# Patient Record
Sex: FEMALE | Race: BLACK OR AFRICAN AMERICAN | NOT HISPANIC OR LATINO | Employment: UNEMPLOYED | URBAN - METROPOLITAN AREA
[De-identification: names, ages, dates, MRNs, and addresses within clinical notes are randomized per-mention and may not be internally consistent; named-entity substitution may affect disease eponyms.]

---

## 2019-12-31 ENCOUNTER — HOSPITAL ENCOUNTER (EMERGENCY)
Facility: HOSPITAL | Age: 34
Discharge: HOME | End: 2019-12-31
Attending: EMERGENCY MEDICINE
Payer: MEDICAID

## 2019-12-31 VITALS
HEART RATE: 80 BPM | WEIGHT: 124 LBS | OXYGEN SATURATION: 98 % | SYSTOLIC BLOOD PRESSURE: 98 MMHG | DIASTOLIC BLOOD PRESSURE: 55 MMHG | TEMPERATURE: 98 F | BODY MASS INDEX: 21.97 KG/M2 | HEIGHT: 63 IN | RESPIRATION RATE: 15 BRPM

## 2019-12-31 DIAGNOSIS — R07.9 CHEST PAIN: ICD-10-CM

## 2019-12-31 DIAGNOSIS — R51.9 NONINTRACTABLE HEADACHE, UNSPECIFIED CHRONICITY PATTERN, UNSPECIFIED HEADACHE TYPE: ICD-10-CM

## 2019-12-31 DIAGNOSIS — B34.9 VIRAL SYNDROME: Primary | ICD-10-CM

## 2019-12-31 LAB
ALBUMIN SERPL BCP-MCNC: 4.7 G/DL (ref 3.5–5.2)
ALP SERPL-CCNC: 62 U/L (ref 55–135)
ALT SERPL W/O P-5'-P-CCNC: 6 U/L (ref 10–44)
ANION GAP SERPL CALC-SCNC: 8 MMOL/L (ref 8–16)
AST SERPL-CCNC: 16 U/L (ref 10–40)
B-HCG UR QL: NEGATIVE
BACTERIA #/AREA URNS HPF: NORMAL /HPF
BASOPHILS # BLD AUTO: 0.01 K/UL (ref 0–0.2)
BASOPHILS NFR BLD: 0.1 % (ref 0–1.9)
BILIRUB SERPL-MCNC: 1 MG/DL (ref 0.1–1)
BILIRUB UR QL STRIP: NEGATIVE
BUN SERPL-MCNC: 8 MG/DL (ref 6–20)
CALCIUM SERPL-MCNC: 9.8 MG/DL (ref 8.7–10.5)
CHLORIDE SERPL-SCNC: 102 MMOL/L (ref 95–110)
CLARITY UR: CLEAR
CO2 SERPL-SCNC: 21 MMOL/L (ref 23–29)
COLOR UR: YELLOW
CREAT SERPL-MCNC: 0.9 MG/DL (ref 0.5–1.4)
CTP QC/QA: YES
CTP QC/QA: YES
DIFFERENTIAL METHOD: ABNORMAL
EOSINOPHIL # BLD AUTO: 0 K/UL (ref 0–0.5)
EOSINOPHIL NFR BLD: 0 % (ref 0–8)
ERYTHROCYTE [DISTWIDTH] IN BLOOD BY AUTOMATED COUNT: 14.9 % (ref 11.5–14.5)
EST. GFR  (AFRICAN AMERICAN): >60 ML/MIN/1.73 M^2
EST. GFR  (NON AFRICAN AMERICAN): >60 ML/MIN/1.73 M^2
GLUCOSE SERPL-MCNC: 82 MG/DL (ref 70–110)
GLUCOSE UR QL STRIP: NEGATIVE
HCT VFR BLD AUTO: 42.3 % (ref 37–48.5)
HGB BLD-MCNC: 13.6 G/DL (ref 12–16)
HGB UR QL STRIP: NEGATIVE
HYALINE CASTS #/AREA URNS LPF: 0 /LPF
IMM GRANULOCYTES # BLD AUTO: 0.03 K/UL (ref 0–0.04)
IMM GRANULOCYTES NFR BLD AUTO: 0.3 % (ref 0–0.5)
KETONES UR QL STRIP: NEGATIVE
LACTATE SERPL-SCNC: 1.4 MMOL/L (ref 0.5–2.2)
LEUKOCYTE ESTERASE UR QL STRIP: NEGATIVE
LYMPHOCYTES # BLD AUTO: 1.2 K/UL (ref 1–4.8)
LYMPHOCYTES NFR BLD: 13.1 % (ref 18–48)
MCH RBC QN AUTO: 26.5 PG (ref 27–31)
MCHC RBC AUTO-ENTMCNC: 32.2 G/DL (ref 32–36)
MCV RBC AUTO: 82 FL (ref 82–98)
MICROSCOPIC COMMENT: NORMAL
MONOCYTES # BLD AUTO: 0.6 K/UL (ref 0.3–1)
MONOCYTES NFR BLD: 6.8 % (ref 4–15)
NEUTROPHILS # BLD AUTO: 7.2 K/UL (ref 1.8–7.7)
NEUTROPHILS NFR BLD: 79.7 % (ref 38–73)
NITRITE UR QL STRIP: NEGATIVE
NRBC BLD-RTO: 0 /100 WBC
PH UR STRIP: 5 [PH] (ref 5–8)
PLATELET # BLD AUTO: 293 K/UL (ref 150–350)
PMV BLD AUTO: 10.2 FL (ref 9.2–12.9)
POC MOLECULAR INFLUENZA A AGN: NEGATIVE
POC MOLECULAR INFLUENZA B AGN: NEGATIVE
POTASSIUM SERPL-SCNC: 3.8 MMOL/L (ref 3.5–5.1)
PROT SERPL-MCNC: 9.3 G/DL (ref 6–8.4)
PROT UR QL STRIP: ABNORMAL
RBC # BLD AUTO: 5.14 M/UL (ref 4–5.4)
RBC #/AREA URNS HPF: 0 /HPF (ref 0–4)
SODIUM SERPL-SCNC: 131 MMOL/L (ref 136–145)
SP GR UR STRIP: 1.02 (ref 1–1.03)
SQUAMOUS #/AREA URNS HPF: 4 /HPF
URN SPEC COLLECT METH UR: ABNORMAL
UROBILINOGEN UR STRIP-ACNC: NEGATIVE EU/DL
WBC # BLD AUTO: 9.01 K/UL (ref 3.9–12.7)
WBC #/AREA URNS HPF: 3 /HPF (ref 0–5)

## 2019-12-31 PROCEDURE — 25000003 PHARM REV CODE 250: Performed by: PHYSICIAN ASSISTANT

## 2019-12-31 PROCEDURE — 63600175 PHARM REV CODE 636 W HCPCS: Performed by: PHYSICIAN ASSISTANT

## 2019-12-31 PROCEDURE — 83605 ASSAY OF LACTIC ACID: CPT

## 2019-12-31 PROCEDURE — 96375 TX/PRO/DX INJ NEW DRUG ADDON: CPT

## 2019-12-31 PROCEDURE — 85025 COMPLETE CBC W/AUTO DIFF WBC: CPT

## 2019-12-31 PROCEDURE — 87040 BLOOD CULTURE FOR BACTERIA: CPT | Mod: 59

## 2019-12-31 PROCEDURE — 96361 HYDRATE IV INFUSION ADD-ON: CPT

## 2019-12-31 PROCEDURE — 80053 COMPREHEN METABOLIC PANEL: CPT

## 2019-12-31 PROCEDURE — 81000 URINALYSIS NONAUTO W/SCOPE: CPT

## 2019-12-31 PROCEDURE — 81025 URINE PREGNANCY TEST: CPT | Performed by: PHYSICIAN ASSISTANT

## 2019-12-31 PROCEDURE — 99285 EMERGENCY DEPT VISIT HI MDM: CPT | Mod: 25

## 2019-12-31 PROCEDURE — 96374 THER/PROPH/DIAG INJ IV PUSH: CPT

## 2019-12-31 PROCEDURE — 93010 ELECTROCARDIOGRAM REPORT: CPT | Mod: ,,, | Performed by: INTERNAL MEDICINE

## 2019-12-31 PROCEDURE — 93005 ELECTROCARDIOGRAM TRACING: CPT

## 2019-12-31 PROCEDURE — 93010 EKG 12-LEAD: ICD-10-PCS | Mod: ,,, | Performed by: INTERNAL MEDICINE

## 2019-12-31 PROCEDURE — 87502 INFLUENZA DNA AMP PROBE: CPT

## 2019-12-31 RX ORDER — KETOROLAC TROMETHAMINE 30 MG/ML
15 INJECTION, SOLUTION INTRAMUSCULAR; INTRAVENOUS
Status: COMPLETED | OUTPATIENT
Start: 2019-12-31 | End: 2019-12-31

## 2019-12-31 RX ORDER — PROCHLORPERAZINE EDISYLATE 5 MG/ML
10 INJECTION INTRAMUSCULAR; INTRAVENOUS
Status: COMPLETED | OUTPATIENT
Start: 2019-12-31 | End: 2019-12-31

## 2019-12-31 RX ORDER — ACETAMINOPHEN 325 MG/1
650 TABLET ORAL
Status: COMPLETED | OUTPATIENT
Start: 2019-12-31 | End: 2019-12-31

## 2019-12-31 RX ORDER — DIPHENHYDRAMINE HYDROCHLORIDE 50 MG/ML
25 INJECTION INTRAMUSCULAR; INTRAVENOUS
Status: COMPLETED | OUTPATIENT
Start: 2019-12-31 | End: 2019-12-31

## 2019-12-31 RX ADMIN — PROCHLORPERAZINE EDISYLATE 10 MG: 5 INJECTION INTRAMUSCULAR; INTRAVENOUS at 01:12

## 2019-12-31 RX ADMIN — DIPHENHYDRAMINE HYDROCHLORIDE 25 MG: 50 INJECTION INTRAMUSCULAR; INTRAVENOUS at 01:12

## 2019-12-31 RX ADMIN — KETOROLAC TROMETHAMINE 15 MG: 30 INJECTION, SOLUTION INTRAMUSCULAR at 11:12

## 2019-12-31 RX ADMIN — ACETAMINOPHEN 650 MG: 325 TABLET ORAL at 11:12

## 2019-12-31 RX ADMIN — SODIUM CHLORIDE 1686 ML: 0.9 INJECTION, SOLUTION INTRAVENOUS at 11:12

## 2019-12-31 NOTE — DISCHARGE INSTRUCTIONS
Continue ibuprofen or Aleve for headache, but do not take both.  You may take Tylenol with either 1 of these medications for headache. Drink plenty of water and fluids with electrolytes to stay hydrated.

## 2019-12-31 NOTE — ED PROVIDER NOTES
Encounter Date: 12/31/2019    SCRIBE #1 NOTE: I, Samantha Ingram, am scribing for, and in the presence of,  Juan Ramon Haynes PA-C . I have scribed the entire note. Other sections scribed: HPI ROS PE .       History     Chief Complaint   Patient presents with    Headache     patient complain of HA, fever, constipation, N/V, chest pain, generalized body aches that started yesterday. patient states that she was recently on ABT for UTI.    Fever    constipation    Nausea    Vomiting    Chest Pain     This is a 34 y.o. female with a PMHx of HIV (reports compliance with medications, last CD4 count was over 800, 2 months ago) and sickle cell trait  who presents to the ED complaining of headache that began few days. The patient reports associated symptoms nausea, 1 episode of emesis, constipation, chest pain, dry cough, body aches and appetite change that began last night. She denies dysuria or abdominal pain. She notes taking Ibuprofen last night with no relief. The patient reports that 1 week ago she had an UTI. 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        Review of patient's allergies indicates:  No Known Allergies  Past Medical History:   Diagnosis Date    HIV (human immunodeficiency virus infection)     Sickle cell trait      Past Surgical History:   Procedure Laterality Date    FRACTURE SURGERY      SINUS SURGERY       Family History   Problem Relation Age of Onset    Breast cancer Neg Hx     Colon cancer Neg Hx     Ovarian cancer Neg Hx      Social History     Tobacco Use    Smoking status: Never Smoker   Substance Use Topics    Alcohol use: No    Drug use: No     Review of Systems   Constitutional: Positive for appetite change and fatigue. Negative for fever.   HENT: Negative for sore throat.    Eyes: Negative for photophobia and visual disturbance.   Respiratory: Positive for cough (dry).    Cardiovascular: Positive for chest pain. Negative for leg swelling.   Gastrointestinal: Positive for  constipation, nausea and vomiting (1 episode ). Negative for abdominal pain.   Genitourinary: Negative for dysuria.   Musculoskeletal: Negative for back pain, neck pain and neck stiffness.   Skin: Negative for pallor and rash.   Neurological: Positive for headaches. Negative for dizziness and syncope.       Physical Exam     Initial Vitals [12/31/19 1046]   BP Pulse Resp Temp SpO2   (!) 95/55 (!) 111 18 (!) 101 °F (38.3 °C) 99 %      MAP       --         Physical Exam    Nursing note and vitals reviewed.  Constitutional: She appears well-developed and well-nourished.   HENT:   Head: Normocephalic and atraumatic.   Right Ear: Tympanic membrane and external ear normal.   Left Ear: Tympanic membrane and external ear normal.   Nose: Nose normal.   Mouth/Throat: Oropharynx is clear and moist and mucous membranes are normal. No trismus in the jaw. No dental abscesses or uvula swelling. No oropharyngeal exudate.   Eyes: Conjunctivae are normal. Pupils are equal, round, and reactive to light.   Neck: Normal range of motion. Neck supple. No Brudzinski's sign and no Kernig's sign noted.   Cardiovascular: Normal rate, regular rhythm, normal heart sounds and intact distal pulses. Exam reveals no friction rub.    No murmur heard.  Pulmonary/Chest: Breath sounds normal. No respiratory distress. She has no wheezes. She has no rhonchi. She has no rales.   Abdominal: Soft. Bowel sounds are normal. She exhibits no distension and no mass. There is no tenderness. There is no rebound and no guarding.   Musculoskeletal: Normal range of motion. She exhibits no edema.   Neurological: She is alert and oriented to person, place, and time. She has normal reflexes.   Skin: Skin is warm and dry. Capillary refill takes less than 2 seconds. No rash noted.         ED Course   Procedures  Labs Reviewed   URINALYSIS, REFLEX TO URINE CULTURE - Abnormal; Notable for the following components:       Result Value    Protein, UA 1+ (*)     All other  components within normal limits    Narrative:     Preferred Collection Type->Urine, Clean Catch   CBC W/ AUTO DIFFERENTIAL - Abnormal; Notable for the following components:    Mean Corpuscular Hemoglobin 26.5 (*)     RDW 14.9 (*)     Gran% 79.7 (*)     Lymph% 13.1 (*)     All other components within normal limits   COMPREHENSIVE METABOLIC PANEL - Abnormal; Notable for the following components:    Sodium 131 (*)     CO2 21 (*)     Total Protein 9.3 (*)     ALT 6 (*)     All other components within normal limits   CULTURE, BLOOD   CULTURE, BLOOD   LACTIC ACID, PLASMA    Narrative:     Recoll. [id number] by [facility] at 12/31/2019 12:02, reason: Specimen   hemolyzed.  Tube has been refrigerated   12/31/2019  12:02   URINALYSIS MICROSCOPIC    Narrative:     Preferred Collection Type->Urine, Clean Catch   POCT URINE PREGNANCY   POCT INFLUENZA A/B MOLECULAR     EKG Readings: (Independently Interpreted)   Initial Reading: No STEMI. Previous EKG: Compared with most recent EKG Previous EKG Date: January 12, 2013. Rhythm: Normal Sinus Rhythm. Ectopy: No Ectopy. Axis: Normal.   .  Good R-wave progression.     ECG Results          EKG 12-lead (In process)  Result time 12/31/19 12:17:31    In process by Interface, Lab In [facility] (12/31/19 12:17:31)                 Narrative:    Test Reason : R07.9,    Vent. Rate : 093 BPM     Atrial Rate : 093 BPM     P-R Int : 142 ms          QRS Dur : 068 ms      QT Int : 354 ms       P-R-T Axes : 069 061 050 degrees     QTc Int : 440 ms    Normal sinus rhythm  Normal ECG  When compared with ECG of 12-JAN-2013 09:38,  Significant changes have occurred    Referred By: [email or web address]   SELF           Confirmed By:                   In process by Interface, Lab In [facility] (12/31/19 12:14:36)                 Narrative:    Test Reason : R07.9,    Vent. Rate : 093 BPM     Atrial Rate : 093 BPM     P-R Int : 142 ms          QRS Dur : 068 ms      QT Int : 354 ms       P-R-T Axes : 069 061 050 degrees      QTc Int : 440 ms    Normal sinus rhythm  Normal ECG  When compared with ECG of 12-JAN-2013 09:38,  Significant changes have occurred    Referred By: [email or web address]   SELF           Confirmed By:                             Imaging Results          X-Ray Chest AP Portable (Final result)  Result time 12/31/19 11:42:55    Final result by Kavon Walsh MD (12/31/19 11:42:55)                 Impression:      No acute chest disease identified.      Electronically signed by: Kavon Walsh MD  Date:    12/31/2019  Time:    11:42             Narrative:    EXAMINATION:  XR CHEST AP PORTABLE    CLINICAL HISTORY:  Sepsis;    TECHNIQUE:  Single frontal view of the chest was performed.    COMPARISON:  01/13/2013.    FINDINGS:  The heart is not enlarged.  Superior mediastinal structures are unremarkable.  Pulmonary vasculature is within normal limits.  The lungs are free of focal consolidations.  There is no evidence for pneumothorax or large pleural effusions.  Bony structures are grossly intact.                              X-Rays:   Independently Interpreted Readings:   Chest X-Ray: Normal heart size.  No infiltrates.  No acute abnormalities.     Medical Decision Making:   ED Management:  34-year-old female with HIV, compliant with medications with last CD4 count over 800, presents with headache and constellation of symptoms likely suggestive of viral syndrome.  Influenza negative. Chest x-ray without evidence of pneumonia, pneumothorax, dissection.  EKG without evidence of acute ischemia or pericarditis.  UPT negative.  UA without evidence for infection.  Lab evaluation is reassuring, including normal lactate and white blood cell count.  Very low suspicion for sepsis or intracranial infection..  Patient has no meningeal signs or evidence of intracranial hemorrhage. No indication for head CT at this time.  Patient treated with IV fluids, Toradol, Tylenol, and Compazine with improved symptoms. She is well-appearing, tolerating  p.o., with improved heart rate.  No indications for antibiotics at this time.  Patient is safe for discharge with continued p.o. hydration, ibuprofen Tylenol as needed for fever.            Scribe Attestation:   Scribe #1: I performed the above scribed service and the documentation accurately describes the services I performed. I attest to the accuracy of the note.                     I, Juan Ramon Haynes, personally performed the services described in this documentation. All medical record entries made by the scribe were at my direction and in my presence.  I have reviewed the chart and agree that the record reflects my personal performance and is accurate and complete        Clinical Impression:       ICD-10-CM ICD-9-CM   1. Viral syndrome B34.9 079.99   2. Chest pain R07.9 786.50   3. Nonintractable headache, unspecified chronicity pattern, unspecified headache type R51 784.0                             Juan Ramon Haynes, PAKalyanC  12/31/19 1506

## 2020-01-02 ENCOUNTER — HOSPITAL ENCOUNTER (EMERGENCY)
Facility: HOSPITAL | Age: 35
Discharge: HOME | End: 2020-01-03
Attending: EMERGENCY MEDICINE
Payer: MEDICAID

## 2020-01-02 VITALS
WEIGHT: 125 LBS | SYSTOLIC BLOOD PRESSURE: 131 MMHG | OXYGEN SATURATION: 100 % | DIASTOLIC BLOOD PRESSURE: 80 MMHG | RESPIRATION RATE: 18 BRPM | HEIGHT: 63 IN | BODY MASS INDEX: 22.15 KG/M2 | HEART RATE: 88 BPM | TEMPERATURE: 100 F

## 2020-01-02 DIAGNOSIS — R11.2 NON-INTRACTABLE VOMITING WITH NAUSEA, UNSPECIFIED VOMITING TYPE: ICD-10-CM

## 2020-01-02 DIAGNOSIS — B34.9 VIRAL SYNDROME: Primary | ICD-10-CM

## 2020-01-02 DIAGNOSIS — R51.9 ACUTE NONINTRACTABLE HEADACHE, UNSPECIFIED HEADACHE TYPE: ICD-10-CM

## 2020-01-02 LAB
ALBUMIN SERPL BCP-MCNC: 4 G/DL (ref 3.5–5.2)
ALP SERPL-CCNC: 54 U/L (ref 55–135)
ALT SERPL W/O P-5'-P-CCNC: 8 U/L (ref 10–44)
ANION GAP SERPL CALC-SCNC: 9 MMOL/L (ref 8–16)
AST SERPL-CCNC: 15 U/L (ref 10–40)
BASOPHILS # BLD AUTO: 0.01 K/UL (ref 0–0.2)
BASOPHILS NFR BLD: 0.2 % (ref 0–1.9)
BILIRUB SERPL-MCNC: 0.2 MG/DL (ref 0.1–1)
BUN SERPL-MCNC: 7 MG/DL (ref 6–20)
CALCIUM SERPL-MCNC: 9.4 MG/DL (ref 8.7–10.5)
CHLORIDE SERPL-SCNC: 109 MMOL/L (ref 95–110)
CO2 SERPL-SCNC: 24 MMOL/L (ref 23–29)
CREAT SERPL-MCNC: 0.8 MG/DL (ref 0.5–1.4)
CTP QC/QA: YES
DIFFERENTIAL METHOD: ABNORMAL
EOSINOPHIL # BLD AUTO: 0.1 K/UL (ref 0–0.5)
EOSINOPHIL NFR BLD: 1.8 % (ref 0–8)
ERYTHROCYTE [DISTWIDTH] IN BLOOD BY AUTOMATED COUNT: 14.3 % (ref 11.5–14.5)
EST. GFR  (AFRICAN AMERICAN): >60 ML/MIN/1.73 M^2
EST. GFR  (NON AFRICAN AMERICAN): >60 ML/MIN/1.73 M^2
GLUCOSE SERPL-MCNC: 100 MG/DL (ref 70–110)
HCT VFR BLD AUTO: 39.9 % (ref 37–48.5)
HGB BLD-MCNC: 12.6 G/DL (ref 12–16)
IMM GRANULOCYTES # BLD AUTO: 0.01 K/UL (ref 0–0.04)
IMM GRANULOCYTES NFR BLD AUTO: 0.2 % (ref 0–0.5)
LYMPHOCYTES # BLD AUTO: 1.2 K/UL (ref 1–4.8)
LYMPHOCYTES NFR BLD: 26.3 % (ref 18–48)
MCH RBC QN AUTO: 26 PG (ref 27–31)
MCHC RBC AUTO-ENTMCNC: 31.6 G/DL (ref 32–36)
MCV RBC AUTO: 82 FL (ref 82–98)
MONOCYTES # BLD AUTO: 0.5 K/UL (ref 0.3–1)
MONOCYTES NFR BLD: 11 % (ref 4–15)
NEUTROPHILS # BLD AUTO: 2.6 K/UL (ref 1.8–7.7)
NEUTROPHILS NFR BLD: 60.5 % (ref 38–73)
NRBC BLD-RTO: 0 /100 WBC
PLATELET # BLD AUTO: 257 K/UL (ref 150–350)
PMV BLD AUTO: 9.5 FL (ref 9.2–12.9)
POC MOLECULAR INFLUENZA A AGN: NEGATIVE
POC MOLECULAR INFLUENZA B AGN: NEGATIVE
POTASSIUM SERPL-SCNC: 4.3 MMOL/L (ref 3.5–5.1)
PROT SERPL-MCNC: 8.2 G/DL (ref 6–8.4)
RBC # BLD AUTO: 4.84 M/UL (ref 4–5.4)
SODIUM SERPL-SCNC: 142 MMOL/L (ref 136–145)
WBC # BLD AUTO: 4.37 K/UL (ref 3.9–12.7)

## 2020-01-02 PROCEDURE — 80053 COMPREHEN METABOLIC PANEL: CPT

## 2020-01-02 PROCEDURE — 99284 EMERGENCY DEPT VISIT MOD MDM: CPT | Mod: 25

## 2020-01-02 PROCEDURE — 96374 THER/PROPH/DIAG INJ IV PUSH: CPT

## 2020-01-02 PROCEDURE — 25000003 PHARM REV CODE 250: Performed by: PHYSICIAN ASSISTANT

## 2020-01-02 PROCEDURE — 87502 INFLUENZA DNA AMP PROBE: CPT

## 2020-01-02 PROCEDURE — 96375 TX/PRO/DX INJ NEW DRUG ADDON: CPT

## 2020-01-02 PROCEDURE — 85025 COMPLETE CBC W/AUTO DIFF WBC: CPT

## 2020-01-02 PROCEDURE — 63600175 PHARM REV CODE 636 W HCPCS: Performed by: PHYSICIAN ASSISTANT

## 2020-01-02 RX ORDER — KETOROLAC TROMETHAMINE 30 MG/ML
15 INJECTION, SOLUTION INTRAMUSCULAR; INTRAVENOUS
Status: COMPLETED | OUTPATIENT
Start: 2020-01-02 | End: 2020-01-02

## 2020-01-02 RX ORDER — BUTALBITAL, ACETAMINOPHEN AND CAFFEINE 50; 325; 40 MG/1; MG/1; MG/1
1 TABLET ORAL EVERY 6 HOURS PRN
Qty: 6 TABLET | Refills: 0 | Status: ON HOLD | OUTPATIENT
Start: 2020-01-02 | End: 2022-02-03 | Stop reason: HOSPADM

## 2020-01-02 RX ORDER — ONDANSETRON 4 MG/1
4 TABLET, ORALLY DISINTEGRATING ORAL
Status: COMPLETED | OUTPATIENT
Start: 2020-01-02 | End: 2020-01-02

## 2020-01-02 RX ORDER — ONDANSETRON 4 MG/1
4 TABLET, ORALLY DISINTEGRATING ORAL EVERY 12 HOURS PRN
Qty: 10 TABLET | Refills: 0 | Status: SHIPPED | OUTPATIENT
Start: 2020-01-02 | End: 2022-04-01 | Stop reason: ALTCHOICE

## 2020-01-02 RX ORDER — ONDANSETRON 2 MG/ML
4 INJECTION INTRAMUSCULAR; INTRAVENOUS
Status: COMPLETED | OUTPATIENT
Start: 2020-01-02 | End: 2020-01-02

## 2020-01-02 RX ORDER — FERROUS GLUCONATE 324(38)MG
324 TABLET ORAL
COMMUNITY

## 2020-01-02 RX ORDER — PROCHLORPERAZINE EDISYLATE 5 MG/ML
10 INJECTION INTRAMUSCULAR; INTRAVENOUS
Status: COMPLETED | OUTPATIENT
Start: 2020-01-02 | End: 2020-01-02

## 2020-01-02 RX ORDER — DIPHENHYDRAMINE HYDROCHLORIDE 50 MG/ML
12.5 INJECTION INTRAMUSCULAR; INTRAVENOUS
Status: COMPLETED | OUTPATIENT
Start: 2020-01-02 | End: 2020-01-02

## 2020-01-02 RX ADMIN — ONDANSETRON 4 MG: 4 TABLET, ORALLY DISINTEGRATING ORAL at 06:01

## 2020-01-02 RX ADMIN — KETOROLAC TROMETHAMINE 15 MG: 30 INJECTION, SOLUTION INTRAMUSCULAR at 06:01

## 2020-01-02 RX ADMIN — ONDANSETRON HYDROCHLORIDE 4 MG: 2 SOLUTION INTRAMUSCULAR; INTRAVENOUS at 06:01

## 2020-01-02 RX ADMIN — SODIUM CHLORIDE 1000 ML: 0.9 INJECTION, SOLUTION INTRAVENOUS at 06:01

## 2020-01-02 RX ADMIN — DIPHENHYDRAMINE HYDROCHLORIDE 12.5 MG: 50 INJECTION INTRAMUSCULAR; INTRAVENOUS at 06:01

## 2020-01-02 RX ADMIN — PROCHLORPERAZINE EDISYLATE 10 MG: 5 INJECTION INTRAMUSCULAR; INTRAVENOUS at 06:01

## 2020-01-02 NOTE — ED TRIAGE NOTES
Pt. Reports she was seen on 12/31/19 for the same symptoms, pt. Reports headaches, vomiting,and fevers. Pt. Reports every time she eats she vomits, emesis x 10 within the last 24 hours. Pt. Reports she has been taking OTC for her symptoms as reccommended.

## 2020-01-03 NOTE — ED PROVIDER NOTES
Encounter Date: 1/2/2020    SCRIBE #1 NOTE: I, Elizabeth Moore, am scribing for, and in the presence of,  Juan Antonio Mcwilliams PA-C. I have scribed the following portions of the note - Other sections scribed: HPI, ROS.       History     Chief Complaint   Patient presents with    Emesis     Vomiting, HA and cough. seen here yesterday      Chief Complaint: Emesis  History of Present Illness: History provided by patient. This 34 y.o. female presents to the Emergency Department complaining of worsening emesis (10 episodes today) onset 2 days ago. She also complains of 10/10 upper abdominal pain, nausea, and headache. She states that she reported to this ED 2 days ago for similar symptoms. She states that the physician advised her to take Ibuprofen, Tylenol, and DayQuil, which she reports alleviated her fever, chest pain, and generalized body aches. She reports recent sick contact with a coworker. She reports a past medical history of HIV and states that she is compliant with her medications.        Review of patient's allergies indicates:  No Known Allergies  Past Medical History:   Diagnosis Date    HIV (human immunodeficiency virus infection)     Sickle cell trait      Past Surgical History:   Procedure Laterality Date    FRACTURE SURGERY      SINUS SURGERY       Family History   Problem Relation Age of Onset    Breast cancer Neg Hx     Colon cancer Neg Hx     Ovarian cancer Neg Hx      Social History     Tobacco Use    Smoking status: Never Smoker    Smokeless tobacco: Never Used   Substance Use Topics    Alcohol use: No    Drug use: No     Review of Systems   Constitutional: Negative for chills and fever.   HENT: Negative for congestion, rhinorrhea and sore throat.    Eyes: Negative for visual disturbance.   Respiratory: Negative for cough and shortness of breath.    Cardiovascular: Negative for chest pain.   Gastrointestinal: Positive for abdominal pain (Upper), nausea and vomiting. Negative for diarrhea.    Genitourinary: Negative for dysuria, frequency and hematuria.   Musculoskeletal: Negative for back pain and myalgias.   Skin: Negative for rash.   Neurological: Positive for headaches. Negative for dizziness and weakness.       Physical Exam     Initial Vitals [01/02/20 1751]   BP Pulse Resp Temp SpO2   131/80 88 18 99.5 °F (37.5 °C) 100 %      MAP       --         Physical Exam    Nursing note and vitals reviewed.  Constitutional: She appears well-developed and well-nourished. No distress.   HENT:   Head: Normocephalic and atraumatic.   Right Ear: Tympanic membrane normal.   Left Ear: Tympanic membrane normal.   Nose: Nose normal.   Mouth/Throat: Uvula is midline, oropharynx is clear and moist and mucous membranes are normal.   Eyes: EOM are normal. Pupils are equal, round, and reactive to light.   Neck: Trachea normal, normal range of motion, full passive range of motion without pain and phonation normal. Neck supple. No stridor present. No spinous process tenderness and no muscular tenderness present. Normal range of motion present. No neck rigidity. No Brudzinski's sign and no Kernig's sign noted.   Cardiovascular: Normal rate, regular rhythm and normal heart sounds. Exam reveals no gallop and no friction rub.    No murmur heard.  Pulmonary/Chest: Effort normal and breath sounds normal. No respiratory distress. She has no wheezes. She has no rhonchi. She has no rales.   Abdominal: Soft. Bowel sounds are normal. There is no tenderness. There is no rigidity, no rebound, no guarding, no CVA tenderness, no tenderness at McBurney's point and negative Brewster's sign.   Musculoskeletal: Normal range of motion.   Neurological: She is alert and oriented to person, place, and time. She has normal strength. No cranial nerve deficit or sensory deficit. She displays a negative Romberg sign. Coordination and gait normal. GCS eye subscore is 4. GCS verbal subscore is 5. GCS motor subscore is 6.   Skin: Skin is warm and dry.  Capillary refill takes less than 2 seconds.   Psychiatric: She has a normal mood and affect.         ED Course   Procedures  Labs Reviewed   CBC W/ AUTO DIFFERENTIAL - Abnormal; Notable for the following components:       Result Value    Mean Corpuscular Hemoglobin 26.0 (*)     Mean Corpuscular Hemoglobin Conc 31.6 (*)     All other components within normal limits   COMPREHENSIVE METABOLIC PANEL - Abnormal; Notable for the following components:    Alkaline Phosphatase 54 (*)     ALT 8 (*)     All other components within normal limits   POCT INFLUENZA A/B MOLECULAR          Imaging Results    None          Medical Decision Making:   Clinical Tests:   Lab Tests: Ordered and Reviewed  ED Management:  This is an evaluation of a 34 y.o. female that presents to the Emergency Department for headache, nausea, vomiting. The patient is a non-toxic, afebrile, and well appearing female. On physical exam: she is AAO, has no focal weakness or neurological deficits. Has full ROM of neck with no nuchal rigidity or meningeal signs.  There is no staggering or ataxic gait, vomiting, double vision, visual loss, slurred speech, numbness of the face or body, weakness, clumsiness, or incoordination. No external signs of head injury or trauma. No tenderness or induration over the temples.  Abdomen is soft and nontender to palpation without rebound tenderness or guarding. She reports NO: history of malignancy, syncope, current nor recent pregnancy, or seizures associated with this headache. she is not immunocompromised.     Vital Signs are Reassuring.  Previous records were reviewed.  Patient was evaluated in this ED or on 01/31/2019 for similar complaint had extensive workup without significant findings.  Etiology of patient's symptoms were likely secondary to viral illness.  RESULTS:  CBC and CMP repeated today.  CBC shows no leukocytosis.  H&H stable. CMP shows no significant electrolyte abnormalities.  BUN and creatinine are stable.  Influenza negative.    Given the above findings, my overall impression is headache secondary to viral syndrome. Differential Diagnosis included but was not limited to: SAH, epidural hematoma, subdural hematoma, CVA, temporal arteritis, migraine headache, meningitis acute angle glaucoma    ED Treatments:  Patient treated the ED with IV fluids, Toradol, Compazine, Benadryl, Zofran with resolution of headache and symptoms. Upon re-evaluation, patient was resting comfortably in reclining chair and in no acute distress.. DC Meds:  I will discharge patient home with Fioricet and Zofran. The diagnosis, treatment plan, instructions for follow-up and reevaluation with PCP as well as ED return precautions have been discussed with the patient and the patient has verbalized an understanding of the information. All questions or concerns have been addressed.                   Scribe Attestation:   Scribe #1: I performed the above scribed service and the documentation accurately describes the services I performed. I attest to the accuracy of the note.                          Clinical Impression:       ICD-10-CM ICD-9-CM   1. Viral syndrome B34.9 079.99   2. Acute nonintractable headache, unspecified headache type R51 784.0   3. Non-intractable vomiting with nausea, unspecified vomiting type R11.2 787.01                             Juan Antonio Mcwilliams PA-C  01/02/20 2100

## 2020-01-05 LAB
BACTERIA BLD CULT: NORMAL
BACTERIA BLD CULT: NORMAL

## 2021-07-30 ENCOUNTER — OFFICE VISIT (OUTPATIENT)
Dept: URGENT CARE | Facility: CLINIC | Age: 36
End: 2021-07-30

## 2021-07-30 VITALS
TEMPERATURE: 98 F | WEIGHT: 125 LBS | DIASTOLIC BLOOD PRESSURE: 74 MMHG | RESPIRATION RATE: 18 BRPM | HEIGHT: 63 IN | BODY MASS INDEX: 22.15 KG/M2 | OXYGEN SATURATION: 99 % | HEART RATE: 101 BPM | SYSTOLIC BLOOD PRESSURE: 98 MMHG

## 2021-07-30 DIAGNOSIS — Z20.822 CLOSE EXPOSURE TO COVID-19 VIRUS: Primary | ICD-10-CM

## 2021-07-30 LAB
CTP QC/QA: YES
SARS-COV-2 RDRP RESP QL NAA+PROBE: NEGATIVE

## 2021-07-30 PROCEDURE — 99203 OFFICE O/P NEW LOW 30 MIN: CPT | Mod: TIER,S$GLB,, | Performed by: NURSE PRACTITIONER

## 2021-07-30 PROCEDURE — 99203 PR OFFICE/OUTPT VISIT, NEW, LEVL III, 30-44 MIN: ICD-10-PCS | Mod: TIER,S$GLB,, | Performed by: NURSE PRACTITIONER

## 2021-07-30 PROCEDURE — U0002: ICD-10-PCS | Mod: QW,S$GLB,, | Performed by: NURSE PRACTITIONER

## 2021-07-30 PROCEDURE — U0002 COVID-19 LAB TEST NON-CDC: HCPCS | Mod: QW,S$GLB,, | Performed by: NURSE PRACTITIONER

## 2022-01-20 ENCOUNTER — HOSPITAL ENCOUNTER (EMERGENCY)
Facility: HOSPITAL | Age: 37
Discharge: HOME | End: 2022-01-21
Attending: EMERGENCY MEDICINE
Payer: MEDICAID

## 2022-01-20 DIAGNOSIS — O20.9 VAGINAL BLEEDING IN PREGNANCY, FIRST TRIMESTER: Primary | ICD-10-CM

## 2022-01-20 LAB
ABO + RH BLD: NORMAL
ALBUMIN SERPL BCP-MCNC: 4.8 G/DL (ref 3.5–5.2)
ALP SERPL-CCNC: 48 U/L (ref 55–135)
ALT SERPL W/O P-5'-P-CCNC: 12 U/L (ref 10–44)
ANION GAP SERPL CALC-SCNC: 11 MMOL/L (ref 8–16)
AST SERPL-CCNC: 16 U/L (ref 10–40)
B-HCG UR QL: POSITIVE
BASOPHILS # BLD AUTO: 0.02 K/UL (ref 0–0.2)
BASOPHILS NFR BLD: 0.2 % (ref 0–1.9)
BILIRUB SERPL-MCNC: 0.4 MG/DL (ref 0.1–1)
BILIRUB UR QL STRIP: NEGATIVE
BUN SERPL-MCNC: 9 MG/DL (ref 6–20)
CALCIUM SERPL-MCNC: 9.3 MG/DL (ref 8.7–10.5)
CHLORIDE SERPL-SCNC: 101 MMOL/L (ref 95–110)
CLARITY UR: CLEAR
CO2 SERPL-SCNC: 22 MMOL/L (ref 23–29)
COLOR UR: YELLOW
CREAT SERPL-MCNC: 0.6 MG/DL (ref 0.5–1.4)
CTP QC/QA: YES
DIFFERENTIAL METHOD: ABNORMAL
EOSINOPHIL # BLD AUTO: 0.2 K/UL (ref 0–0.5)
EOSINOPHIL NFR BLD: 2.6 % (ref 0–8)
ERYTHROCYTE [DISTWIDTH] IN BLOOD BY AUTOMATED COUNT: 13.3 % (ref 11.5–14.5)
EST. GFR  (AFRICAN AMERICAN): >60 ML/MIN/1.73 M^2
EST. GFR  (NON AFRICAN AMERICAN): >60 ML/MIN/1.73 M^2
GLUCOSE SERPL-MCNC: 95 MG/DL (ref 70–110)
GLUCOSE UR QL STRIP: NEGATIVE
HCG INTACT+B SERPL-ACNC: NORMAL MIU/ML
HCT VFR BLD AUTO: 41.5 % (ref 37–48.5)
HGB BLD-MCNC: 14 G/DL (ref 12–16)
HGB UR QL STRIP: NEGATIVE
IMM GRANULOCYTES # BLD AUTO: 0.05 K/UL (ref 0–0.04)
IMM GRANULOCYTES NFR BLD AUTO: 0.6 % (ref 0–0.5)
KETONES UR QL STRIP: NEGATIVE
LEUKOCYTE ESTERASE UR QL STRIP: NEGATIVE
LYMPHOCYTES # BLD AUTO: 2.4 K/UL (ref 1–4.8)
LYMPHOCYTES NFR BLD: 27.7 % (ref 18–48)
MCH RBC QN AUTO: 28.1 PG (ref 27–31)
MCHC RBC AUTO-ENTMCNC: 33.7 G/DL (ref 32–36)
MCV RBC AUTO: 83 FL (ref 82–98)
MONOCYTES # BLD AUTO: 0.8 K/UL (ref 0.3–1)
MONOCYTES NFR BLD: 8.7 % (ref 4–15)
NEUTROPHILS # BLD AUTO: 5.2 K/UL (ref 1.8–7.7)
NEUTROPHILS NFR BLD: 60.2 % (ref 38–73)
NITRITE UR QL STRIP: NEGATIVE
NRBC BLD-RTO: 0 /100 WBC
PH UR STRIP: 6 [PH] (ref 5–8)
PLATELET # BLD AUTO: 333 K/UL (ref 150–450)
PMV BLD AUTO: 9.5 FL (ref 9.2–12.9)
POTASSIUM SERPL-SCNC: 3.5 MMOL/L (ref 3.5–5.1)
PROT SERPL-MCNC: 8.8 G/DL (ref 6–8.4)
PROT UR QL STRIP: ABNORMAL
RBC # BLD AUTO: 4.99 M/UL (ref 4–5.4)
SODIUM SERPL-SCNC: 134 MMOL/L (ref 136–145)
SP GR UR STRIP: 1.02 (ref 1–1.03)
URN SPEC COLLECT METH UR: ABNORMAL
UROBILINOGEN UR STRIP-ACNC: NEGATIVE EU/DL
WBC # BLD AUTO: 8.59 K/UL (ref 3.9–12.7)

## 2022-01-20 PROCEDURE — 80053 COMPREHEN METABOLIC PANEL: CPT | Performed by: PHYSICIAN ASSISTANT

## 2022-01-20 PROCEDURE — 81025 URINE PREGNANCY TEST: CPT | Performed by: PHYSICIAN ASSISTANT

## 2022-01-20 PROCEDURE — 81003 URINALYSIS AUTO W/O SCOPE: CPT | Performed by: PHYSICIAN ASSISTANT

## 2022-01-20 PROCEDURE — 99284 EMERGENCY DEPT VISIT MOD MDM: CPT

## 2022-01-20 PROCEDURE — 87491 CHLMYD TRACH DNA AMP PROBE: CPT | Performed by: STUDENT IN AN ORGANIZED HEALTH CARE EDUCATION/TRAINING PROGRAM

## 2022-01-20 PROCEDURE — 87591 N.GONORRHOEAE DNA AMP PROB: CPT | Performed by: STUDENT IN AN ORGANIZED HEALTH CARE EDUCATION/TRAINING PROGRAM

## 2022-01-20 PROCEDURE — 84702 CHORIONIC GONADOTROPIN TEST: CPT | Performed by: PHYSICIAN ASSISTANT

## 2022-01-20 PROCEDURE — 86901 BLOOD TYPING SEROLOGIC RH(D): CPT | Performed by: PHYSICIAN ASSISTANT

## 2022-01-20 PROCEDURE — 85025 COMPLETE CBC W/AUTO DIFF WBC: CPT | Performed by: PHYSICIAN ASSISTANT

## 2022-01-20 RX ORDER — LANOLIN ALCOHOL/MO/W.PET/CERES
50 CREAM (GRAM) TOPICAL
Status: COMPLETED | OUTPATIENT
Start: 2022-01-20 | End: 2022-01-21

## 2022-01-20 RX ORDER — ACETAMINOPHEN 500 MG
1000 TABLET ORAL
Status: COMPLETED | OUTPATIENT
Start: 2022-01-20 | End: 2022-01-21

## 2022-01-20 RX ORDER — SODIUM CHLORIDE 9 MG/ML
INJECTION, SOLUTION INTRAVENOUS
Status: DISCONTINUED | OUTPATIENT
Start: 2022-01-20 | End: 2022-01-21 | Stop reason: HOSPADM

## 2022-01-21 VITALS
RESPIRATION RATE: 16 BRPM | HEART RATE: 82 BPM | HEIGHT: 63 IN | WEIGHT: 125 LBS | TEMPERATURE: 99 F | BODY MASS INDEX: 22.15 KG/M2 | OXYGEN SATURATION: 100 % | DIASTOLIC BLOOD PRESSURE: 76 MMHG | SYSTOLIC BLOOD PRESSURE: 108 MMHG

## 2022-01-21 LAB
BILIRUB UR QL STRIP: NEGATIVE
CLARITY UR: CLEAR
COLOR UR: YELLOW
GLUCOSE UR QL STRIP: NEGATIVE
HGB UR QL STRIP: NEGATIVE
KETONES UR QL STRIP: NEGATIVE
LEUKOCYTE ESTERASE UR QL STRIP: NEGATIVE
NITRITE UR QL STRIP: NEGATIVE
PH UR STRIP: 6 [PH] (ref 5–8)
PROT UR QL STRIP: ABNORMAL
SARS-COV-2 RDRP RESP QL NAA+PROBE: NEGATIVE
SP GR UR STRIP: 1.02 (ref 1–1.03)
URN SPEC COLLECT METH UR: ABNORMAL
UROBILINOGEN UR STRIP-ACNC: NEGATIVE EU/DL

## 2022-01-21 PROCEDURE — 25000003 PHARM REV CODE 250: Performed by: STUDENT IN AN ORGANIZED HEALTH CARE EDUCATION/TRAINING PROGRAM

## 2022-01-21 PROCEDURE — 81003 URINALYSIS AUTO W/O SCOPE: CPT | Performed by: STUDENT IN AN ORGANIZED HEALTH CARE EDUCATION/TRAINING PROGRAM

## 2022-01-21 PROCEDURE — U0002 COVID-19 LAB TEST NON-CDC: HCPCS | Performed by: STUDENT IN AN ORGANIZED HEALTH CARE EDUCATION/TRAINING PROGRAM

## 2022-01-21 RX ORDER — BICTEGRAVIR SODIUM, EMTRICITABINE, AND TENOFOVIR ALAFENAMIDE FUMARATE 50; 200; 25 MG/1; MG/1; MG/1
1 TABLET ORAL DAILY
COMMUNITY

## 2022-01-21 RX ORDER — DOXYLAMINE SUCCINATE AND PYRIDOXINE HYDROCHLORIDE, DELAYED RELEASE TABLETS 10 MG/10 MG 10; 10 MG/1; MG/1
2 TABLET, DELAYED RELEASE ORAL NIGHTLY
Qty: 20 TABLET | Refills: 0 | Status: ON HOLD | OUTPATIENT
Start: 2022-01-21 | End: 2022-02-03 | Stop reason: HOSPADM

## 2022-01-21 RX ORDER — ERGOCALCIFEROL 1.25 MG/1
50000 CAPSULE ORAL
COMMUNITY

## 2022-01-21 RX ADMIN — PYRIDOXINE HCL TAB 50 MG 50 MG: 50 TAB at 12:01

## 2022-01-21 RX ADMIN — ACETAMINOPHEN 1000 MG: 500 TABLET, FILM COATED ORAL at 12:01

## 2022-01-21 NOTE — FIRST PROVIDER EVALUATION
Emergency Department TeleTriage Encounter Note      CHIEF COMPLAINT    Chief Complaint   Patient presents with    Vaginal Bleeding     Positive at home pregnancy test end of December. Started vaginal bleeding with cramping Jan 2.- still spotting       VITAL SIGNS   Initial Vitals [01/20/22 1844]   BP Pulse Resp Temp SpO2   (!) 140/87 88 18 98.6 °F (37 °C) 100 %      MAP       --            ALLERGIES    Review of patient's allergies indicates:  No Known Allergies    PROVIDER TRIAGE NOTE    Patient is a 36-year-old female who presents with intermittent vaginal bleeding.  She reports having a positive home pregnancy test at the end of December.  She denies urinary symptoms.    Initial orders will be placed and care will be transferred to an alternate provider when patient is roomed for a full evaluation. Any additional orders and the final disposition will be determined by that provider.      ORDERS  Labs Reviewed   CBC W/ AUTO DIFFERENTIAL   COMPREHENSIVE METABOLIC PANEL   HCG, QUANTITATIVE   URINALYSIS, REFLEX TO URINE CULTURE   POCT URINE PREGNANCY   GROUP & RH       ED Orders (720h ago, onward)    Start Ordered     Status Ordering Provider    01/20/22 1903 01/20/22 1859  US OB Transvaginal  1 time imaging         Ordered [facility]    01/20/22 1859 01/20/22 1859  CBC W/ AUTO DIFFERENTIAL  STAT         Ordered [facility]    01/20/22 1859 01/20/22 1859  Comp. Metabolic Panel  STAT         Ordered [facility]    01/20/22 1859 01/20/22 1859  hCG, quantitative  STAT         Ordered [facility] [place]    01/20/22 1859 01/20/22 1859  ABO/Rh  STAT         Ordered [facility]GR[facility]    01/20/22 1859 01/20/22 1859  Urinalysis, Reflex to Urine Culture Urine, Clean Catch  STAT         Ordered [facility]GROTTO [facility]    01/20/22 1859 01/20/22 1859  POCT urine pregnancy  Once         Ordered [facility]    01/20/22 1859 01/20/22 1859  Setup  Pelvic Tray  Once         Ordered [facility]            Virtual Visit Note: The provider triage portion of this emergency department evaluation and documentation was performed via [employer], a HIPAA-compliant telemedicine application, in concert with a tele-presenter in the room. A face to face patient evaluation with one of my colleagues will occur once the patient is placed in an emergency department room.      DISCLAIMER: This note was prepared with [employer] voice recognition transcription software. Garbled syntax, mangled pronouns, and other bizarre constructions may be attributed to that software system.

## 2022-01-21 NOTE — DISCHARGE INSTRUCTIONS
Please call and schedule an appointment with Dr. [facility] on Monday to get repeat BCG level and US

## 2022-01-21 NOTE — ED PROVIDER NOTES
Encounter Date: 2022       History     Chief Complaint   Patient presents with    Vaginal Bleeding     Positive at home pregnancy test end of December. Started vaginal bleeding with cramping .- still spotting     HPI  35 yo F  presents with chief complaint of pelvic pain and vaginal bleeding.  Patient reports 1 day of heavy vaginal bleeding approximately two weeks ago which self-resolved.  Since that time she has had light vaginal spotting, brown vaginal discharge.  LMP 2021.  Patient complains of mild lower abdominal cramping and back pain.  Denies any abdominal trauma.  No dysuria, hematuria, pain with urination.  Denies any vaginal itching, burning, or lesions.  Patient has not seen a provider for this pregnancy. No current OB. No recent fevers, chills, diarrhea, urinary symptoms.  Patient reports increased weakness and nausea over the last several days but no emesis. No known medical problems. ROS otherwise negative.     Review of patient's allergies indicates:  No Known Allergies  Past Medical History:   Diagnosis Date    HIV (human immunodeficiency virus infection)     Sickle cell trait      Past Surgical History:   Procedure Laterality Date    FRACTURE SURGERY      SINUS SURGERY       Family History   Problem Relation Age of Onset    Breast cancer Neg Hx     Colon cancer Neg Hx     Ovarian cancer Neg Hx      Social History     Tobacco Use    Smoking status: Never Smoker    Smokeless tobacco: Never Used   Substance Use Topics    Alcohol use: No    Drug use: No     Review of Systems   Constitutional: Negative for appetite change, chills and fever.   HENT: Negative for congestion and trouble swallowing.    Eyes: Negative for visual disturbance.   Respiratory: Negative for cough and shortness of breath.    Gastrointestinal: Positive for nausea. Negative for diarrhea and vomiting.   Genitourinary: Positive for pelvic pain and vaginal bleeding. Negative for decreased urine  volume, dysuria, frequency, vaginal discharge and vaginal pain.   Musculoskeletal: Positive for back pain.   Skin: Negative for pallor.   Neurological: Negative for dizziness, syncope, weakness, light-headedness and headaches.       Physical Exam     Initial Vitals [01/20/22 1844]   BP Pulse Resp Temp SpO2   (!) 140/87 88 18 98.6 °F (37 °C) 100 %      MAP       --         Physical Exam    Constitutional: She appears well-developed and well-nourished. No distress.   HENT:   Head: Normocephalic and atraumatic.   Moist mucous membranes   Eyes: Conjunctivae and EOM are normal.   Neck:   Normal range of motion.  Cardiovascular: Normal rate.   Pulmonary/Chest: Breath sounds normal. No respiratory distress.   Abdominal: Abdomen is soft. There is no abdominal tenderness. There is no rebound and no guarding.   Genitourinary:    Uterus normal.   Uterus is not tender. Cervix exhibits motion tenderness. Cervix exhibits no discharge. Right adnexum displays no mass and no tenderness. Left adnexum displays no mass and no tenderness.    Vaginal discharge present.      No vaginal erythema or bleeding.   No erythema or bleeding in the vagina.    No foreign body in the vagina.      Genitourinary Comments: Cervix close, tender on palpation, no friability     Musculoskeletal:      Cervical back: Normal range of motion.           ED Course   Procedures  Labs Reviewed   CBC W/ AUTO DIFFERENTIAL - Abnormal; Notable for the following components:       Result Value    Immature Granulocytes 0.6 (*)     Immature Grans (Abs) 0.05 (*)     All other components within normal limits    Narrative:     Release to patient->Immediate   COMPREHENSIVE METABOLIC PANEL - Abnormal; Notable for the following components:    Sodium 134 (*)     CO2 22 (*)     Total Protein 8.8 (*)     Alkaline Phosphatase 48 (*)     All other components within normal limits    Narrative:     Release to patient->Immediate   URINALYSIS, REFLEX TO URINE CULTURE - Abnormal; Notable  for the following components:    Protein, UA Trace (*)     All other components within normal limits    Narrative:     Specimen Source->Urine   URINALYSIS, REFLEX TO URINE CULTURE - Abnormal; Notable for the following components:    Protein, UA Trace (*)     All other components within normal limits    Narrative:     Specimen Source->Urine   POCT URINE PREGNANCY - Abnormal; Notable for the following components:    POC Preg Test, Ur Positive (*)     All other components within normal limits   C. TRACHOMATIS/N. GONORRHOEAE BY AMP DNA   HCG, QUANTITATIVE    Narrative:     Release to patient->Immediate   SARS-COV-2 RNA AMPLIFICATION, QUAL   GROUP & RH          Imaging Results          US OB Transvaginal (Final result)  Result time 01/20/22 22:25:14   Procedure changed from US OB <14 Wks TransAbd & TransVag, Single Gestation (XPD)     Final result by Koffi Milligan MD (01/20/22 22:25:14)                 Narrative:    EXAM DESCRIPTION: US OB TRANSVAGINAL 1/20/2022 10:21 PM CST    CLINICAL HISTORY: 36 years, Female, Vag Bleeding    COMPARISON: None..    TECHNIQUE:    Utilizing a transvaginal array transducer, real-time ultrasound evaluation of the female pelvis was performed. Color Doppler imaging was used to assess vascular flow.    FINDINGS:    The uterus measures 8.8 x 5.6 x 5.9 cm. A gestational sac demonstrate normal shape and configuration with multiple measurements giving a ultrasonographic mean measurement of 3.02 cm corresponding to a ultrasonographic gestational age of 7 weeks and 6 days with an estimated date of delivery of the September 2, 2022 , correlation was visualized. No fetal pole was seen. The cervix is closed.  The right ovary measured 4.1 x 3.0 x 2.9 cm with the presence of the corpus luteum cyst 2.5 x 2.5 x 2.3 cm, the left ovary measures 3.0 x 1.7 x 2.6 cm. There is normal vascular flow and spectral waveforms with no evidence for torsion.  No free fluid was identified in the posterior cul-de-sac,  no adnexal masses seen.    IMPRESSION:    FINDINGS SUGGEST EARLY INTRAUTERINE PREGNANCY CORRESPONDING TO A ULTRASONOGRAPHIC GESTATIONAL AGE OF 7 WEEKS AND 6 DAYS. NO FETAL POLE IS IDENTIFIED. FOLLOW-UP WITH SERIAL BETA-HCG AND/OR ULTRASOUND IS RECOMMENDED.  NO ADNEXAL MASSES SEEN.    Electronically signed by:  Koffi Milligan MD  2022 10:25 PM CST Workstation: [id number]                               Medications   0.9%  NaCl infusion (has no administration in time range)   pyridoxine (vitamin B6) tablet 50 mg (50 mg Oral Given 22)   acetaminophen tablet 1,000 mg (1,000 mg Oral Given 22)     Medical Decision Making:   Initial Assessment:   37 yo F  presents with chief complaint of pelvic pain and vaginal bleeding.  Patient reports 1 day of heavy vaginal bleeding approximately two weeks ago which self-resolved.  VS notable for mild hypertension otherwise within normal limits.  On exam there is no abdominal tenderness.  No CVA tenderness.  Cranial nerve exam unremarkable.  Upon exam there is scant dried plan in the vaginal vault without any purulent discharge.  Cervix is closed.  No friability.  Tenderness upon palpation.  Uterus nontender.  No adnexal fullness or tenderness.  H&H stable.  CMP without any electrolyte abnormalities or evidence of severe dehydration.  UA without any evidence of acute infection or ketones.  Transvaginal ultrasound showed 3 cm gestational sac without any identifiable fetal pole.  Given patient's LMP and ultrasound estimated 7 weeks 6 days WGA.  The lack of fetal pole is concerning for nonviable pregnancy.  Patient currently tolerating p.o. after given pyridoxine.  Reports improvement in pain with Tylenol.  Counseled on ultrasound findings and need for close follow-up with OB.  Patient does not currently have an OB, referred to Dr. Tran's clinic. Voiced understanding of RTC precautions. Stable for discharge at this time with prescriptions for pyridoxine and  dicyclomine.   Tanna Mensah   Emergency Medicine PGY3  1:08 AM                 ED Course as of 01/21/22 0108   Thu Jan 20, 2022   2254 HCG Quant: [id number] [MS]      ED Course User Index  [MS] Tanna Mensah MD             Clinical Impression:   Final diagnoses:  [O20.9] Vaginal bleeding in pregnancy, first trimester (Primary)                 Tanna Mensah MD  Resident  01/21/22 0108

## 2022-01-25 LAB
CHLAMYDIA, AMPLIFIED DNA: NEGATIVE
N GONORRHOEAE, AMPLIFIED DNA: NEGATIVE

## 2022-02-03 ENCOUNTER — HOSPITAL ENCOUNTER (OUTPATIENT)
Facility: HOSPITAL | Age: 37
Discharge: HOME | End: 2022-02-03
Attending: EMERGENCY MEDICINE | Admitting: OBSTETRICS & GYNECOLOGY
Payer: MEDICAID

## 2022-02-03 VITALS
HEIGHT: 63 IN | RESPIRATION RATE: 17 BRPM | WEIGHT: 145 LBS | TEMPERATURE: 100 F | BODY MASS INDEX: 25.69 KG/M2 | OXYGEN SATURATION: 98 % | DIASTOLIC BLOOD PRESSURE: 67 MMHG | HEART RATE: 102 BPM | SYSTOLIC BLOOD PRESSURE: 101 MMHG

## 2022-02-03 DIAGNOSIS — O03.9 ABORTION: ICD-10-CM

## 2022-02-03 DIAGNOSIS — O03.4 INCOMPLETE ABORTION: Primary | ICD-10-CM

## 2022-02-03 LAB
ABO + RH BLD: NORMAL
ALBUMIN SERPL BCP-MCNC: 3.5 G/DL (ref 3.5–5.2)
ALP SERPL-CCNC: 36 U/L (ref 55–135)
ALT SERPL W/O P-5'-P-CCNC: 13 U/L (ref 10–44)
ANION GAP SERPL CALC-SCNC: 10 MMOL/L (ref 8–16)
APTT PPP: 25.4 SEC (ref 23.3–35.1)
AST SERPL-CCNC: 18 U/L (ref 10–40)
BASOPHILS # BLD AUTO: 0.01 K/UL (ref 0–0.2)
BASOPHILS # BLD AUTO: 0.02 K/UL (ref 0–0.2)
BASOPHILS # BLD AUTO: 0.05 K/UL (ref 0–0.2)
BASOPHILS NFR BLD: 0.1 % (ref 0–1.9)
BASOPHILS NFR BLD: 0.1 % (ref 0–1.9)
BASOPHILS NFR BLD: 0.4 % (ref 0–1.9)
BILIRUB SERPL-MCNC: 0.4 MG/DL (ref 0.1–1)
BLD GP AB SCN CELLS X3 SERPL QL: NORMAL
BLD PROD TYP BPU: NORMAL
BLD PROD TYP BPU: NORMAL
BLOOD UNIT EXPIRATION DATE: NORMAL
BLOOD UNIT EXPIRATION DATE: NORMAL
BLOOD UNIT TYPE CODE: 9500
BLOOD UNIT TYPE CODE: 9500
BLOOD UNIT TYPE: NORMAL
BLOOD UNIT TYPE: NORMAL
BUN SERPL-MCNC: 13 MG/DL (ref 6–20)
CALCIUM SERPL-MCNC: 8.4 MG/DL (ref 8.7–10.5)
CHLORIDE SERPL-SCNC: 103 MMOL/L (ref 95–110)
CO2 SERPL-SCNC: 19 MMOL/L (ref 23–29)
CODING SYSTEM: NORMAL
CODING SYSTEM: NORMAL
CREAT SERPL-MCNC: 0.6 MG/DL (ref 0.5–1.4)
DIFFERENTIAL METHOD: ABNORMAL
DISPENSE STATUS: NORMAL
DISPENSE STATUS: NORMAL
EOSINOPHIL # BLD AUTO: 0 K/UL (ref 0–0.5)
EOSINOPHIL NFR BLD: 0 % (ref 0–8)
ERYTHROCYTE [DISTWIDTH] IN BLOOD BY AUTOMATED COUNT: 13.2 % (ref 11.5–14.5)
ERYTHROCYTE [DISTWIDTH] IN BLOOD BY AUTOMATED COUNT: 13.4 % (ref 11.5–14.5)
ERYTHROCYTE [DISTWIDTH] IN BLOOD BY AUTOMATED COUNT: 13.9 % (ref 11.5–14.5)
EST. GFR  (AFRICAN AMERICAN): >60 ML/MIN/1.73 M^2
EST. GFR  (NON AFRICAN AMERICAN): >60 ML/MIN/1.73 M^2
GLUCOSE SERPL-MCNC: 209 MG/DL (ref 70–110)
HCG INTACT+B SERPL-ACNC: 8475 MIU/ML
HCT VFR BLD AUTO: 26.6 % (ref 37–48.5)
HCT VFR BLD AUTO: 32.5 % (ref 37–48.5)
HCT VFR BLD AUTO: 38.4 % (ref 37–48.5)
HGB BLD-MCNC: 11.7 G/DL (ref 12–16)
HGB BLD-MCNC: 13 G/DL (ref 12–16)
HGB BLD-MCNC: 8.5 G/DL (ref 12–16)
IMM GRANULOCYTES # BLD AUTO: 0.13 K/UL (ref 0–0.04)
IMM GRANULOCYTES # BLD AUTO: 0.17 K/UL (ref 0–0.04)
IMM GRANULOCYTES # BLD AUTO: 0.23 K/UL (ref 0–0.04)
IMM GRANULOCYTES NFR BLD AUTO: 0.9 % (ref 0–0.5)
IMM GRANULOCYTES NFR BLD AUTO: 1.6 % (ref 0–0.5)
IMM GRANULOCYTES NFR BLD AUTO: 1.6 % (ref 0–0.5)
INR PPP: 1.5
LYMPHOCYTES # BLD AUTO: 1.4 K/UL (ref 1–4.8)
LYMPHOCYTES # BLD AUTO: 1.4 K/UL (ref 1–4.8)
LYMPHOCYTES # BLD AUTO: 1.8 K/UL (ref 1–4.8)
LYMPHOCYTES NFR BLD: 10.2 % (ref 18–48)
LYMPHOCYTES NFR BLD: 12.9 % (ref 18–48)
LYMPHOCYTES NFR BLD: 13 % (ref 18–48)
MCH RBC QN AUTO: 27.2 PG (ref 27–31)
MCH RBC QN AUTO: 29.4 PG (ref 27–31)
MCH RBC QN AUTO: 29.9 PG (ref 27–31)
MCHC RBC AUTO-ENTMCNC: 32 G/DL (ref 32–36)
MCHC RBC AUTO-ENTMCNC: 33.9 G/DL (ref 32–36)
MCHC RBC AUTO-ENTMCNC: 36 G/DL (ref 32–36)
MCV RBC AUTO: 83 FL (ref 82–98)
MCV RBC AUTO: 85 FL (ref 82–98)
MCV RBC AUTO: 87 FL (ref 82–98)
MONOCYTES # BLD AUTO: 0.3 K/UL (ref 0.3–1)
MONOCYTES # BLD AUTO: 0.6 K/UL (ref 0.3–1)
MONOCYTES # BLD AUTO: 0.9 K/UL (ref 0.3–1)
MONOCYTES NFR BLD: 3.2 % (ref 4–15)
MONOCYTES NFR BLD: 4.5 % (ref 4–15)
MONOCYTES NFR BLD: 6 % (ref 4–15)
NEUTROPHILS # BLD AUTO: 11.4 K/UL (ref 1.8–7.7)
NEUTROPHILS # BLD AUTO: 11.8 K/UL (ref 1.8–7.7)
NEUTROPHILS # BLD AUTO: 8.9 K/UL (ref 1.8–7.7)
NEUTROPHILS NFR BLD: 80 % (ref 38–73)
NEUTROPHILS NFR BLD: 82.2 % (ref 38–73)
NEUTROPHILS NFR BLD: 83.3 % (ref 38–73)
NRBC BLD-RTO: 0 /100 WBC
NUM UNITS TRANS PACKED RBC: NORMAL
NUM UNITS TRANS PACKED RBC: NORMAL
PLATELET # BLD AUTO: 167 K/UL (ref 150–450)
PLATELET # BLD AUTO: 194 K/UL (ref 150–450)
PLATELET # BLD AUTO: 261 K/UL (ref 150–450)
PMV BLD AUTO: 10.1 FL (ref 9.2–12.9)
PMV BLD AUTO: 9.6 FL (ref 9.2–12.9)
PMV BLD AUTO: 9.9 FL (ref 9.2–12.9)
POTASSIUM SERPL-SCNC: 4.3 MMOL/L (ref 3.5–5.1)
PROT SERPL-MCNC: 6.3 G/DL (ref 6–8.4)
PROTHROMBIN TIME: 17.1 SEC (ref 11.4–13.7)
RBC # BLD AUTO: 3.12 M/UL (ref 4–5.4)
RBC # BLD AUTO: 3.91 M/UL (ref 4–5.4)
RBC # BLD AUTO: 4.42 M/UL (ref 4–5.4)
SARS-COV-2 RDRP RESP QL NAA+PROBE: NEGATIVE
SODIUM SERPL-SCNC: 132 MMOL/L (ref 136–145)
WBC # BLD AUTO: 10.79 K/UL (ref 3.9–12.7)
WBC # BLD AUTO: 14.15 K/UL (ref 3.9–12.7)
WBC # BLD AUTO: 14.2 K/UL (ref 3.9–12.7)

## 2022-02-03 PROCEDURE — 96361 HYDRATE IV INFUSION ADD-ON: CPT

## 2022-02-03 PROCEDURE — 84702 CHORIONIC GONADOTROPIN TEST: CPT | Performed by: STUDENT IN AN ORGANIZED HEALTH CARE EDUCATION/TRAINING PROGRAM

## 2022-02-03 PROCEDURE — 63600175 PHARM REV CODE 636 W HCPCS: Performed by: OBSTETRICS & GYNECOLOGY

## 2022-02-03 PROCEDURE — 25000003 PHARM REV CODE 250: Performed by: OBSTETRICS & GYNECOLOGY

## 2022-02-03 PROCEDURE — U0002 COVID-19 LAB TEST NON-CDC: HCPCS | Performed by: STUDENT IN AN ORGANIZED HEALTH CARE EDUCATION/TRAINING PROGRAM

## 2022-02-03 PROCEDURE — 85730 THROMBOPLASTIN TIME PARTIAL: CPT | Performed by: EMERGENCY MEDICINE

## 2022-02-03 PROCEDURE — P9016 RBC LEUKOCYTES REDUCED: HCPCS | Mod: 59 | Performed by: EMERGENCY MEDICINE

## 2022-02-03 PROCEDURE — 36430 TRANSFUSION BLD/BLD COMPNT: CPT

## 2022-02-03 PROCEDURE — 96365 THER/PROPH/DIAG IV INF INIT: CPT

## 2022-02-03 PROCEDURE — 96372 THER/PROPH/DIAG INJ SC/IM: CPT | Mod: 59 | Performed by: EMERGENCY MEDICINE

## 2022-02-03 PROCEDURE — 85025 COMPLETE CBC W/AUTO DIFF WBC: CPT | Mod: 91 | Performed by: OBSTETRICS & GYNECOLOGY

## 2022-02-03 PROCEDURE — 99291 CRITICAL CARE FIRST HOUR: CPT | Mod: 25

## 2022-02-03 PROCEDURE — 96372 THER/PROPH/DIAG INJ SC/IM: CPT | Mod: 59

## 2022-02-03 PROCEDURE — 63600175 PHARM REV CODE 636 W HCPCS: Performed by: EMERGENCY MEDICINE

## 2022-02-03 PROCEDURE — G0378 HOSPITAL OBSERVATION PER HR: HCPCS

## 2022-02-03 PROCEDURE — 96375 TX/PRO/DX INJ NEW DRUG ADDON: CPT

## 2022-02-03 PROCEDURE — 80053 COMPREHEN METABOLIC PANEL: CPT | Performed by: EMERGENCY MEDICINE

## 2022-02-03 PROCEDURE — 86901 BLOOD TYPING SEROLOGIC RH(D): CPT | Performed by: EMERGENCY MEDICINE

## 2022-02-03 PROCEDURE — 36415 COLL VENOUS BLD VENIPUNCTURE: CPT | Performed by: OBSTETRICS & GYNECOLOGY

## 2022-02-03 PROCEDURE — 96372 THER/PROPH/DIAG INJ SC/IM: CPT

## 2022-02-03 PROCEDURE — 85610 PROTHROMBIN TIME: CPT | Performed by: EMERGENCY MEDICINE

## 2022-02-03 PROCEDURE — 96367 TX/PROPH/DG ADDL SEQ IV INF: CPT

## 2022-02-03 PROCEDURE — 85025 COMPLETE CBC W/AUTO DIFF WBC: CPT | Performed by: EMERGENCY MEDICINE

## 2022-02-03 PROCEDURE — 96376 TX/PRO/DX INJ SAME DRUG ADON: CPT

## 2022-02-03 PROCEDURE — 86900 BLOOD TYPING SEROLOGIC ABO: CPT | Performed by: EMERGENCY MEDICINE

## 2022-02-03 PROCEDURE — P9040 RBC LEUKOREDUCED IRRADIATED: HCPCS | Performed by: EMERGENCY MEDICINE

## 2022-02-03 PROCEDURE — 86920 COMPATIBILITY TEST SPIN: CPT | Performed by: EMERGENCY MEDICINE

## 2022-02-03 PROCEDURE — 25000003 PHARM REV CODE 250: Performed by: STUDENT IN AN ORGANIZED HEALTH CARE EDUCATION/TRAINING PROGRAM

## 2022-02-03 PROCEDURE — 85025 COMPLETE CBC W/AUTO DIFF WBC: CPT | Mod: 91 | Performed by: STUDENT IN AN ORGANIZED HEALTH CARE EDUCATION/TRAINING PROGRAM

## 2022-02-03 RX ORDER — MEPERIDINE HYDROCHLORIDE 50 MG/ML
25 INJECTION INTRAMUSCULAR; INTRAVENOUS; SUBCUTANEOUS ONCE
Status: COMPLETED | OUTPATIENT
Start: 2022-02-03 | End: 2022-02-03

## 2022-02-03 RX ORDER — SODIUM CHLORIDE, SODIUM LACTATE, POTASSIUM CHLORIDE, CALCIUM CHLORIDE 600; 310; 30; 20 MG/100ML; MG/100ML; MG/100ML; MG/100ML
500 INJECTION, SOLUTION INTRAVENOUS CONTINUOUS
Status: ACTIVE | OUTPATIENT
Start: 2022-02-03 | End: 2022-02-03

## 2022-02-03 RX ORDER — MISOPROSTOL 200 UG/1
600 TABLET ORAL ONCE
Status: COMPLETED | OUTPATIENT
Start: 2022-02-03 | End: 2022-02-03

## 2022-02-03 RX ORDER — HYDROCODONE BITARTRATE AND ACETAMINOPHEN 500; 5 MG/1; MG/1
TABLET ORAL
Status: DISCONTINUED | OUTPATIENT
Start: 2022-02-03 | End: 2022-02-03 | Stop reason: HOSPADM

## 2022-02-03 RX ORDER — PROMETHAZINE HYDROCHLORIDE 25 MG/ML
12.5 INJECTION, SOLUTION INTRAMUSCULAR; INTRAVENOUS EVERY 6 HOURS PRN
Status: DISCONTINUED | OUTPATIENT
Start: 2022-02-03 | End: 2022-02-03

## 2022-02-03 RX ORDER — METHYLERGONOVINE MALEATE 0.2 MG/ML
200 INJECTION INTRAVENOUS ONCE
Status: COMPLETED | OUTPATIENT
Start: 2022-02-03 | End: 2022-02-03

## 2022-02-03 RX ORDER — HYDROMORPHONE HYDROCHLORIDE 1 MG/ML
0.2 INJECTION, SOLUTION INTRAMUSCULAR; INTRAVENOUS; SUBCUTANEOUS
Status: COMPLETED | OUTPATIENT
Start: 2022-02-03 | End: 2022-02-03

## 2022-02-03 RX ORDER — METHYLERGONOVINE MALEATE 0.2 MG/ML
200 INJECTION INTRAVENOUS ONCE
Status: DISCONTINUED | OUTPATIENT
Start: 2022-02-03 | End: 2022-02-03 | Stop reason: HOSPADM

## 2022-02-03 RX ORDER — OXYCODONE AND ACETAMINOPHEN 5; 325 MG/1; MG/1
1 TABLET ORAL EVERY 4 HOURS PRN
Status: ON HOLD | COMMUNITY
End: 2022-02-03 | Stop reason: HOSPADM

## 2022-02-03 RX ADMIN — HYDROMORPHONE HYDROCHLORIDE 0.2 MG: 1 INJECTION, SOLUTION INTRAMUSCULAR; INTRAVENOUS; SUBCUTANEOUS at 07:02

## 2022-02-03 RX ADMIN — TRANEXAMIC ACID 1000 MG: 100 INJECTION, SOLUTION INTRAVENOUS at 04:02

## 2022-02-03 RX ADMIN — SODIUM CHLORIDE, SODIUM LACTATE, POTASSIUM CHLORIDE, AND CALCIUM CHLORIDE 500 ML: .6; .31; .03; .02 INJECTION, SOLUTION INTRAVENOUS at 07:02

## 2022-02-03 RX ADMIN — SODIUM CHLORIDE, SODIUM LACTATE, POTASSIUM CHLORIDE, AND CALCIUM CHLORIDE 1000 ML: .6; .31; .03; .02 INJECTION, SOLUTION INTRAVENOUS at 03:02

## 2022-02-03 RX ADMIN — MISOPROSTOL 600 MCG: 200 TABLET ORAL at 07:02

## 2022-02-03 RX ADMIN — PROMETHAZINE HYDROCHLORIDE 12.5 MG: 25 INJECTION INTRAMUSCULAR; INTRAVENOUS at 09:02

## 2022-02-03 RX ADMIN — MEPERIDINE HYDROCHLORIDE 25 MG: 50 INJECTION INTRAMUSCULAR; INTRAVENOUS; SUBCUTANEOUS at 09:02

## 2022-02-03 RX ADMIN — METHYLERGONOVINE MALEATE 200 MCG: 0.2 INJECTION, SOLUTION INTRAMUSCULAR; INTRAVENOUS at 07:02

## 2022-02-03 NOTE — PLAN OF CARE
"Critical access hospital  Initial Discharge Assessment       Primary Care Provider: Blanca Rowell MD    Admission Diagnosis:  [O03.9]    Admission Date: 2/3/2022  Expected Discharge Date: 2022    Discharge Barriers Identified: None    Payor: MEDICAID / Plan: [facility] COMMUNITY PLAN [employer] ([place] MEDICAID) / Product Type: Managed Medicaid /     Extended Emergency Contact Information  Primary Emergency Contact: Cindy De Leon   [facility]  Home Phone: [phone]  Relation: Mother    Discharge Plan A: Home  Discharge Plan B: Home      [id number] - [place] - [address]  [address] [address]  Phone: [phone] Fax: [phone]    Assessment completed: at bedside with Pt.  Advance Directives: Patient has not addressed.  Discharge Plan: Home  Needs: None     SW Intern conducted a brief assessment at bedside with patient. Patient utilizes [employer] for travel and states that her eldest child, Nacho De Leon (10 years old) will assist her at home should she need it. Patient has no further needs at this time. Case Management to continue to follow.      Initial Assessment (most recent)     Adult Discharge Assessment - 22 1423        Discharge Assessment    Assessment Type Discharge Planning Assessment     Confirmed/corrected address, phone number and insurance Yes     Confirmed Demographics Correct on Facesheet     Source of Information patient     When was your last doctors appointment? --   Patient states, "sometime last year"    Does patient/caregiver understand observation status Yes     Communicated TORREY with patient/caregiver Date not available/Unable to determine     Reason For Admission No active principal problem     Lives With child(reynaldo), dependent     Facility Arrived From: Home     Do you expect to return to your current living situation? Yes     Do you have help at home or someone to help you " manage your care at home? Yes     Who are your caregiver(s) and their phone number(s)? Nacho De Leon-son (10 years old)     Prior to hospitilization cognitive status: Alert/Oriented     Current cognitive status: Alert/Oriented     Equipment Currently Used at Home none     Readmission within 30 days? No     Do you currently have service(s) that help you manage your care at home? No     Do you take prescription medications? Yes     Do you have prescription coverage? Yes     Coverage Medicaid     Do you have any problems affording any of your prescribed medications? No     Is the patient taking medications as prescribed? yes     Who is going to help you get home at discharge? Patient states either her friend, Blanca or [employer]     How do you get to doctors appointments? other (see comments)   [employer]    Are you on dialysis? No     Do you take coumadin? No     Discharge Plan A Home     Discharge Plan B Home     DME Needed Upon Discharge  none     Discharge Plan discussed with: Patient     Discharge Barriers Identified None

## 2022-02-03 NOTE — ED PROVIDER NOTES
Encounter Date: 2/3/2022       History     Chief Complaint   Patient presents with    Miscarriage     Seen at OB yesterday, given meds to help miscarriage, bleeding since 4pm yesterday    Vaginal Bleeding     HPI   36-year-old female  at 11wk 2d by LMP with past medical history of HIV, sickle cell trait who presents with vaginal bleeding.  History limited secondary to patient's significant distress.  Per EMS, patient saw her OB gyn yesterday who prescribed her some pills in order to cause a miscarriage.  Since approximately 4:00 p.m. yesterday, she has had vaginal bleeding.  Initially, she was using multiple pads, but when these were not sufficient, she was using towels and resorted to sitting over the toilet.    Review of patient's allergies indicates:  No Known Allergies  Past Medical History:   Diagnosis Date    HIV (human immunodeficiency virus infection)     Sickle cell trait      Past Surgical History:   Procedure Laterality Date    FRACTURE SURGERY      SINUS SURGERY       Family History   Problem Relation Age of Onset    Breast cancer Neg Hx     Colon cancer Neg Hx     Ovarian cancer Neg Hx      Social History     Tobacco Use    Smoking status: Never Smoker    Smokeless tobacco: Never Used   Substance Use Topics    Alcohol use: No    Drug use: No     Review of Systems   Unable to perform ROS: Acuity of condition       Physical Exam     Initial Vitals   BP Pulse Resp Temp SpO2   22 0321 22 0321 22 0321 22 0325 22 0321   103/66 (!) 114 16 97.7 °F (36.5 °C) 100 %      MAP       --                Physical Exam    Nursing note and vitals reviewed.  Constitutional: She appears well-developed. She appears distressed.   Lethargic   HENT:   Head: Normocephalic and atraumatic.   Eyes: EOM are normal. Pupils are equal, round, and reactive to light.   Pale conjunctiva   Neck: Neck supple.   Normal range of motion.  Cardiovascular: Regular rhythm. Exam reveals no gallop  and no friction rub.    No murmur heard.  Tachycardic; weak and thready peripheral pulses   Pulmonary/Chest: No respiratory distress. She has no wheezes.   Abdominal: Abdomen is soft. She exhibits no distension. There is no abdominal tenderness.   Musculoskeletal:         General: Normal range of motion.      Cervical back: Normal range of motion and neck supple.     Neurological: No cranial nerve deficit. GCS eye subscore is 3. GCS verbal subscore is 3. GCS motor subscore is 6.   Weakly follows commands x4   Skin: Skin is warm and dry.   Psychiatric: She has a normal mood and affect.         ED Course   Procedures  Labs Reviewed   CBC W/ AUTO DIFFERENTIAL - Abnormal; Notable for the following components:       Result Value    RBC 3.12 (*)     Hemoglobin 8.5 (*)     Hematocrit 26.6 (*)     Immature Granulocytes 1.6 (*)     Gran # (ANC) 8.9 (*)     Immature Grans (Abs) 0.17 (*)     Gran % 82.2 (*)     Lymph % 12.9 (*)     Mono % 3.2 (*)     All other components within normal limits   COMPREHENSIVE METABOLIC PANEL - Abnormal; Notable for the following components:    Sodium 132 (*)     CO2 19 (*)     Glucose 209 (*)     Calcium 8.4 (*)     Alkaline Phosphatase 36 (*)     All other components within normal limits   PROTIME-INR - Abnormal; Notable for the following components:    PT 17.1 (*)     All other components within normal limits   APTT   SARS-COV-2 RNA AMPLIFICATION, QUAL   CBC W/ AUTO DIFFERENTIAL   HCG, QUANTITATIVE   HIV RNA, QUANTITATIVE, PCR   T-HELPER CELLS (CD4) COUNT   TYPE & SCREEN   PREPARE RBC SOFT   PREPARE RBC SOFT          Imaging Results          US OB <14 Wks, TransAbd, Single Gestation (In process)  Result time 02/03/22 05:39:21   Procedure changed from US OB Transvaginal                  Medications   0.9%  NaCl infusion (for blood administration) (has no administration in time range)   0.9%  NaCl infusion (for blood administration) (has no administration in time range)   methylergonovine  injection 200 mcg (has no administration in time range)   miSOPROStoL tablet 600 mcg (has no administration in time range)   lactated ringers bolus 1,000 mL (0 mLs Intravenous Stopped 2/3/22 8753)   tranexamic acid (CYKLOKAPRON) 1,000 mg in sodium chloride 0.9% 100 mL (0 mg Intravenous Stopped 2/3/22 5043)           APC / Resident Notes:   [age] year female with past medical history as above who presents with vaginal bleeding.  Afebrile, tachycardic, hypertensive to 70 systolic with normal respiratory rate and normal oxygen saturation.  Physical exam notable for pale conjunctiva, altered mental status with GCS of 11, tachycardic but otherwise normal cardiopulmonary exam with weak peripheral pulses.  Differential diagnosis includes  spectrum, hemorrhagic shock, anemia, electrolyte derangements.  Given patient's acute and hypotension in the setting of active hemorrhage, patient was given 2 units of emergency RBCs as well as 1 L of LR for hypertension with improvement in mental status.  Labs ordered which showed a significant decrease in her hemoglobin from 14 to 8.5.  No indication for RhoGAM at this time.  Pelvic exam shows open cervical os with retained products of conception, c/w incomplete AB.  Extraction was attempted with ring tip forceps with only partial removal of products.  Will consult gynecology for admission.    Helen Leyva  U PGY-3  Emergency Medicine  6:04 AM 2/3/2022          Attending Attestation:         Attending Critical Care:   Critical Care Times:   Direct Patient Care (initial evaluation, reassessments, and time considering the case)................................................................15 minutes.   Additional History from reviewing old medical records or taking additional history from the family, EMS, PCP, etc.......................5 minutes.   Ordering, Reviewing, and Interpreting Diagnostic  Studies...............................................................................................................5 minutes.   Documentation..................................................................................................................................................................................5 minutes.   Consultation with other Physicians. .................................................................................................................................................5 minutes.   ==============================================================  · Total Critical Care Time - exclusive of procedural time: 35 minutes.  ==============================================================  Critical care was necessary to treat or prevent imminent or life-threatening deterioration of the following conditions: hypotension.   The following critical care procedures were done by me (see procedure notes): blood draw for specimens and pulse oximetry.   Critical care was time spent personally by me on the following activities: obtaining history from patient or relative, examination of patient, review of x-rays / CT sent with the patient, development of treatment plan with patient or relative, ordering and performing treatments and interventions, ordering lab, x-rays, and/or EKG, evaluation of patient's response to treatment, discussions with primary provider, interpretation of cardiac measurements and re-evaluation of patient's conition.   Critical Care Condition: potentially life-threatening       Attending ED Notes:   I have seen and evaluated the patient at bedside and I mostly agree with the resident's history, physical examination, clinical impression, and disposition.  Please see my addendum below for further details and explanation.  I was present for the key portions of the separately billed procedures, including bedside ultrasounds.    This is a 36-year-old female who presents emergency  department with syncopal episode with hypotension in the setting of heavy vaginal bleeding in the setting of miscarriage.  She received medication yesterday to help with miscarriage.  She has GCS of around 11 on initial evaluation she is hypotensive and tachycardic.  She has 2 large-bore IVs and been established.  She has received IV fluids.  We have typed and crossed for 2 units of blood.  We initiated resuscitation on the patient.  She started to have labs come back and hemoglobin was 8.8.  It was a drop from 14/2 weeks ago.  Patient had heavy bleeding all day today.  Is still bleeding on pelvic exam.  Patient improved significantly after 2 units of blood and IV fluids.  Resident was able to pull out some tissue out of the cervical os and some clots.  Bleeding has slowed down and improved.  Case was discussed with OBGYN who will admit the patient for observation they did want Methergine IM administered which I ordered for the patient.      ED Course as of 22 0622   Thu 2022   0411 Hemoglobin(!): 8.5  Two weeks ago, patient was 14 [CC]   0441 On reassessment, patient is more interactive and able to answer questions appropriately [CC]   0527 Patient is O positive previous blood type, no indication for RhoGAM at this time. [CC]   0534 OB is Dr. Destiny Tran [CC]      ED Course User Index  [CC] Helen Leyva MD             Clinical Impression:   Final diagnoses:  [O03.9]   [O03.4] Incomplete  (Primary)          ED Disposition Condition    Observation               Callender Autumn, MD  Resident  22       Jaron Mccabe,   22

## 2022-02-03 NOTE — PROGRESS NOTES
Pt seen and examined in ER.  She says bleeding has greatly diminished, and she is feeling well after 3 units of blood  VSS, AF    Gen - NAD  Vagina - scant blood in vault  CVX - slightly open, thick    A/p - s/p SAB , hemorrhage, 2 units PRBC    Admit for observation  Will do QBL and likely repeat u/s later  cytotec 600 mg and methergine 0.2mg given now

## 2022-02-03 NOTE — PROGRESS NOTES
Pt had one episode of bleeding -  ml    H/h 11/32    Will re check u/s to r/o retained POC and likely d/c pt home

## 2022-02-03 NOTE — NURSING
0830-received from ER via stretcher, blood and LR infusing.  Nausea/vomiting and pain noted.      0838-notified MD of nausea/vomiting    0840-order received from Dr. Tran    0915-#3 unit PRBC's completed, Saline lock flushed well, additional blanket and Ice provided per Pt's request    0930-Demerol 25mg/12.5mg phenergan administered in 50 ml, slow IV pain 5/10 on admin, reinforced that MD would be back to update Plan of Care and determine surgical and discharge status, voiced understanding    1000-sleeping, arouses easily, denies pain/nausea    1100-Dr. Tran updated via telephone, order received    1205-lab at bedside collecting CBC     1210-up to bathroom, pericare performed per pt, diaper weighed 220    1235-asleep, arouses easily, states very small amount of vaginal bleeding since pad change    1400-Dr. Tran at bedside, orders received, crackers/peanut butter/juice provided, awaiting regular diet and transvag US    1655-US impression provided to Dr. Tran from US report    1745-Dr. Tran here, discharge orders received, saline locks d/c'd with pressure dressings applied

## 2022-02-03 NOTE — DISCHARGE SUMMARY
[facility]  Discharge Summary  Obstetrics - Triage      Admit Date: 2/3/2022    Discharge Date and Time:  2022 5:43 PM    Attending Physician: Destiny Tran MD     Discharge Provider: Destiny Tran    Reason for Admission: incomplete spontaneous     Hospital Course (synopsis of major diagnoses, care, treatment, and services provided during the course of the hospital stay):     Roberta De Leon is a 36 y.o.  female who presented to the ER after taking Cytotec for a missed spontaneous .  She presented with hemorrhage and she was hypotensive.  She was given TXA and 3 units of packed red blood cells and she stabilized.  She was admitted for observation throughout the day and she had no further heavy bleeding.  Ultrasound was done which showed no retained products of conception.  Upon discharge she has no complaints, her vital signs are stable, she is afebrile, and her bleeding is minimal.  She will be discharged home with a prescription for Methergine for 3 more doses orally and she will follow up with me next week.  We discussed bleeding precautions.  Post transfusion hemoglobin was 11    Gen - NAD  Abdomen - soft, NTND  Ext - no edema, no calf tenderness.    She was admitted to the labor and delivery triage area. Vital signs on admit were: Temp: 97.7 °F (36.5 °C), Pulse: (!) 114, Resp: 16, BP: 103/66, SpO2: 100 %.      Final Diagnoses:    Principal Problem:    Secondary Diagnoses: hemorrhage    Discharged Condition: good    Disposition: Home or Self Care    Follow Up/Patient Instructions:     Medications:  Reconciled Home Medications:      Medication List      CONTINUE taking these medications    BIKTARVY -25 mg per tablet  Generic drug: tscjgmwzg-rrbjofgt-cbqyxbl ala  Take 1 tablet by mouth once daily. HCS     ergocalciferol 50,000 unit Cap  Commonly known as: ERGOCALCIFEROL  Take 50,000 Units by mouth every 7 days.      ferrous gluconate 324 MG  tablet  Commonly known as: FERGON  Take 324 mg by mouth daily with breakfast.     ondansetron 4 MG Tbdl  Commonly known as: ZOFRAN-ODT  Take 1 tablet (4 mg total) by mouth every 12 (twelve) hours as needed.     PNV 11-iron fum-folic acid-om3 28 mg iron-1 mg -200 mg Cap  Commonly known as: VIVA DHA  Take 1 capsule by mouth once daily.        STOP taking these medications    butalbital-acetaminophen-caffeine -40 mg -40 mg per tablet  Commonly known as: FIORICET, ESGIC     doxylamine-pyridoxine (vit B6) 10-10 mg Tbec  Commonly known as: DICLEGIS     lugfukukkb-ylbgtwlc-ltkamm ala 200-25-25 mg Tab  Commonly known as: ODEFSEY     oxyCODONE-acetaminophen 5-325 mg per tablet  Commonly known as: PERCOCET     promethazine 25 MG tablet  Commonly known as: PHENERGAN          Discharge Procedure Orders   Diet Adult Regular     Lifting restrictions   Scheduling Instructions: No lifting greater than 10 # for 6 weeks     No driving until:   Order Comments: No driving for 2 weeks

## 2022-02-03 NOTE — H&P
[facility]  Obstetrics  History & Physical    Patient Name: Roberta De Leon  MRN: [id number]  Admission Date: 2/3/2022  Primary Care Provider: Blanca Rowell MD    Subjective:     Principal Problem: miscarriage    History of Present Illness:  The patient is a 36-year-old G 11p 2 2 72 who was diagnosed with a missed spontaneous  at approximately 6 weeks yesterday.  She was given a prescription for Cytotec which she took last night.  She took 2 doses and then began having extremely heavy bleeding which continued throughout the night, but she did not come into the hospital until she started feeling like she was going to pass out.  Upon arrival to the emergency room she was having very heavy bleeding, she was hypotensive, and hard to arouse.  She was given tranexamic acid and blood transfusion was started.  The ER physician removed blood clots and products of conception from her vagina.  Ultrasound performed showed an endometrium of 2 cm which was heterogenous and without distinct products of conception.  Ob was consulted approximately 3 hours after the patient arrived.  When I saw the patient this morning, she was receiving her 3rd unit of blood.  She had very little bleeding and her uterine contractions were improved.      She was given a dose of Cytotec 600 mcg orally and Methergine 0.2 mg IM and admitted for observation.    Gen - NAD  Vagina - scant old blood in vault  Cervix - slightly open, thick  Uterus - slightly enlarged, non tender      OB History    Para Term  AB Living   11 1 0 1 8 1   SAB IAB Ectopic Multiple Live Births   7 1 0 0 1      # Outcome Date GA Lbr Raghav/2nd Weight Sex Delivery Anes PTL Lv   11 Current            10  11 36w0d  2.807 kg (6 lb 3 oz) M Vag-Spont EPI N PRINCESS      Name: Nacho   9 SAB            8 SAB            7             6 IAB            5 SAB            4 SAB            3 SAB            2 SAB            1 SAB               Past Medical History:   Diagnosis Date    HIV (human immunodeficiency virus infection)     Sickle cell trait      Past Surgical History:   Procedure Laterality Date    FRACTURE SURGERY      SINUS SURGERY         PTA Medications   Medication Sig    eoyhuccev-ubidbbso-fwmpuoe ala (BIKTARVY) -25 mg per tablet Take 1 tablet by mouth once daily. HCS    ergocalciferol (ERGOCALCIFEROL) 50,000 unit Cap Take 50,000 Units by mouth every 7 days. TUESDAYS    ferrous gluconate (FERGON) 324 MG tablet Take 324 mg by mouth daily with breakfast.    ondansetron (ZOFRAN-ODT) 4 MG TbDL Take 1 tablet (4 mg total) by mouth every 12 (twelve) hours as needed.    butalbital-acetaminophen-caffeine -40 mg (FIORICET, ESGIC) -40 mg per tablet Take 1 tablet by mouth every 6 (six) hours as needed for Pain.    doxylamine-pyridoxine, vit B6, (DICLEGIS) 10-10 mg TbEC Take 2 tablets by mouth every evening.    vxbnhfxggo-kfdozbdi-djaick ala (ODEFSEY) 200-25-25 mg Tab Odefsey 200 mg-25 mg-25 mg tablet    oxyCODONE-acetaminophen (PERCOCET) 5-325 mg per tablet Take 1 tablet by mouth every 4 (four) hours as needed for Pain.    PNV 11-iron fum-folic acid-om3 (VIVA DHA) 28-1-200 mg Cap Take 1 capsule by mouth once daily.    promethazine (PHENERGAN) 25 MG tablet Take 1 tablet (25 mg total) by mouth every 4 (four) hours.       Review of patient's allergies indicates:  No Known Allergies     Family History    None       Tobacco Use    Smoking status: Never Smoker    Smokeless tobacco: Never Used   Substance and Sexual Activity    Alcohol use: No    Drug use: No    Sexual activity: Never     Partners: Male     Birth control/protection: None     Review of Systems   Objective:     Vital Signs (Most Recent):  Temp: 98.8 °F (37.1 °C) (02/03/22 1128)  Pulse: 93 (02/03/22 1128)  Resp: 17 (02/03/22 1128)  BP: 107/69 (02/03/22 1128)  SpO2: 99 % (02/03/22 1128) Vital Signs (24h Range):  Temp:  [97.7 °F (36.5 °C)-99.1 °F (37.3  °C)] 98.8 °F (37.1 °C)  Pulse:  [] 93  Resp:  [13-19] 17  SpO2:  [97 %-100 %] 99 %  BP: ()/(51-91) 107/69     Weight: 65.8 kg (145 lb)  Body mass index is 25.69 kg/m².    Significant Labs:  Lab Results   Component Value Date    GROUPTRH O POS 2022    HEPBSAG Negative 2016    STREPBCULT  2011     MODERATE/ HEAVY   STREPTOCOCCUS AGALACTIAE (GROUP B)  Ampicillin                      SENSITIVE  Clindamycin                     SENSITIVE  Erythromycin                    SENSITIVE  Penicillin                      SENSITIVE  Vancomycin                      SENSITIVE       I have personallly reviewed all pertinent lab results from the last 24 hours.    Assessment/Plan:     36 y.o. female status post spontaneous  with hemorrhage, seems to be  Resolved now.    Admit the patient for observation to observe bleeding throughout the day and recheck blood count.    There are no hospital problems to display for this patient.          Destiny Tran MD  Obstetrics  [facility]

## 2022-02-07 LAB
BLD PROD TYP BPU: NORMAL
BLD PROD TYP BPU: NORMAL
BLOOD UNIT EXPIRATION DATE: NORMAL
BLOOD UNIT EXPIRATION DATE: NORMAL
BLOOD UNIT TYPE CODE: 5100
BLOOD UNIT TYPE CODE: 5100
BLOOD UNIT TYPE: NORMAL
BLOOD UNIT TYPE: NORMAL
CODING SYSTEM: NORMAL
CODING SYSTEM: NORMAL
DISPENSE STATUS: NORMAL
DISPENSE STATUS: NORMAL
NUM UNITS TRANS PACKED RBC: NORMAL
NUM UNITS TRANS PACKED RBC: NORMAL

## 2022-03-22 ENCOUNTER — HOSPITAL ENCOUNTER (EMERGENCY)
Facility: HOSPITAL | Age: 37
Discharge: HOME | End: 2022-03-22
Attending: EMERGENCY MEDICINE
Payer: MEDICAID

## 2022-03-22 VITALS
HEIGHT: 63 IN | BODY MASS INDEX: 24.8 KG/M2 | TEMPERATURE: 98 F | WEIGHT: 140 LBS | SYSTOLIC BLOOD PRESSURE: 119 MMHG | DIASTOLIC BLOOD PRESSURE: 75 MMHG | HEART RATE: 73 BPM | OXYGEN SATURATION: 100 % | RESPIRATION RATE: 15 BRPM

## 2022-03-22 DIAGNOSIS — N39.0 URINARY TRACT INFECTION WITHOUT HEMATURIA, SITE UNSPECIFIED: Primary | ICD-10-CM

## 2022-03-22 DIAGNOSIS — R50.9 FEVER: ICD-10-CM

## 2022-03-22 DIAGNOSIS — J06.9 UPPER RESPIRATORY TRACT INFECTION, UNSPECIFIED TYPE: ICD-10-CM

## 2022-03-22 LAB
ALBUMIN SERPL BCP-MCNC: 4 G/DL (ref 3.5–5.2)
ALP SERPL-CCNC: 48 U/L (ref 55–135)
ALT SERPL W/O P-5'-P-CCNC: 9 U/L (ref 10–44)
ANION GAP SERPL CALC-SCNC: 6 MMOL/L (ref 8–16)
AST SERPL-CCNC: 18 U/L (ref 10–40)
B-HCG UR QL: POSITIVE
BACTERIA #/AREA URNS HPF: NEGATIVE /HPF
BASOPHILS # BLD AUTO: 0.02 K/UL (ref 0–0.2)
BASOPHILS NFR BLD: 0.2 % (ref 0–1.9)
BILIRUB SERPL-MCNC: 0.5 MG/DL (ref 0.1–1)
BILIRUB UR QL STRIP: NEGATIVE
BUN SERPL-MCNC: 8 MG/DL (ref 6–20)
CALCIUM SERPL-MCNC: 8.6 MG/DL (ref 8.7–10.5)
CHLORIDE SERPL-SCNC: 107 MMOL/L (ref 95–110)
CLARITY UR: ABNORMAL
CO2 SERPL-SCNC: 26 MMOL/L (ref 23–29)
COLOR UR: YELLOW
CREAT SERPL-MCNC: 0.9 MG/DL (ref 0.5–1.4)
CTP QC/QA: YES
DIFFERENTIAL METHOD: ABNORMAL
EOSINOPHIL # BLD AUTO: 0.4 K/UL (ref 0–0.5)
EOSINOPHIL NFR BLD: 4.2 % (ref 0–8)
ERYTHROCYTE [DISTWIDTH] IN BLOOD BY AUTOMATED COUNT: 14.8 % (ref 11.5–14.5)
EST. GFR  (AFRICAN AMERICAN): >60 ML/MIN/1.73 M^2
EST. GFR  (NON AFRICAN AMERICAN): >60 ML/MIN/1.73 M^2
GLUCOSE SERPL-MCNC: 93 MG/DL (ref 70–110)
GLUCOSE UR QL STRIP: NEGATIVE
GROUP A STREP, MOLECULAR: NEGATIVE
HCG INTACT+B SERPL-ACNC: 83 MIU/ML
HCT VFR BLD AUTO: 29.7 % (ref 37–48.5)
HGB BLD-MCNC: 9.3 G/DL (ref 12–16)
HGB UR QL STRIP: ABNORMAL
HYALINE CASTS #/AREA URNS LPF: 23 /LPF
IMM GRANULOCYTES # BLD AUTO: 0.03 K/UL (ref 0–0.04)
IMM GRANULOCYTES NFR BLD AUTO: 0.4 % (ref 0–0.5)
INFLUENZA A, MOLECULAR: NEGATIVE
INFLUENZA B, MOLECULAR: NEGATIVE
KETONES UR QL STRIP: NEGATIVE
LACTATE SERPL-SCNC: 1.1 MMOL/L (ref 0.5–1.9)
LEUKOCYTE ESTERASE UR QL STRIP: ABNORMAL
LYMPHOCYTES # BLD AUTO: 1.8 K/UL (ref 1–4.8)
LYMPHOCYTES NFR BLD: 21.3 % (ref 18–48)
MCH RBC QN AUTO: 25.3 PG (ref 27–31)
MCHC RBC AUTO-ENTMCNC: 31.3 G/DL (ref 32–36)
MCV RBC AUTO: 81 FL (ref 82–98)
MICROSCOPIC COMMENT: ABNORMAL
MONOCYTES # BLD AUTO: 0.8 K/UL (ref 0.3–1)
MONOCYTES NFR BLD: 9.3 % (ref 4–15)
NEUTROPHILS # BLD AUTO: 5.4 K/UL (ref 1.8–7.7)
NEUTROPHILS NFR BLD: 64.6 % (ref 38–73)
NITRITE UR QL STRIP: NEGATIVE
NRBC BLD-RTO: 0 /100 WBC
PH UR STRIP: 6 [PH] (ref 5–8)
PLATELET # BLD AUTO: 346 K/UL (ref 150–450)
PMV BLD AUTO: 10.3 FL (ref 9.2–12.9)
POTASSIUM SERPL-SCNC: 3.6 MMOL/L (ref 3.5–5.1)
PROT SERPL-MCNC: 7.6 G/DL (ref 6–8.4)
PROT UR QL STRIP: ABNORMAL
RBC # BLD AUTO: 3.68 M/UL (ref 4–5.4)
RBC #/AREA URNS HPF: 3 /HPF (ref 0–4)
SARS-COV-2 RDRP RESP QL NAA+PROBE: NEGATIVE
SODIUM SERPL-SCNC: 139 MMOL/L (ref 136–145)
SP GR UR STRIP: >1.03 (ref 1–1.03)
SPECIMEN SOURCE: NORMAL
SQUAMOUS #/AREA URNS HPF: 9 /HPF
URN SPEC COLLECT METH UR: ABNORMAL
UROBILINOGEN UR STRIP-ACNC: NEGATIVE EU/DL
WBC # BLD AUTO: 8.39 K/UL (ref 3.9–12.7)
WBC #/AREA URNS HPF: 13 /HPF (ref 0–5)

## 2022-03-22 PROCEDURE — 84702 CHORIONIC GONADOTROPIN TEST: CPT | Performed by: EMERGENCY MEDICINE

## 2022-03-22 PROCEDURE — 87502 INFLUENZA DNA AMP PROBE: CPT | Performed by: EMERGENCY MEDICINE

## 2022-03-22 PROCEDURE — 81001 URINALYSIS AUTO W/SCOPE: CPT | Performed by: EMERGENCY MEDICINE

## 2022-03-22 PROCEDURE — 25000003 PHARM REV CODE 250: Performed by: EMERGENCY MEDICINE

## 2022-03-22 PROCEDURE — 99285 EMERGENCY DEPT VISIT HI MDM: CPT | Mod: 25

## 2022-03-22 PROCEDURE — 63600175 PHARM REV CODE 636 W HCPCS: Performed by: EMERGENCY MEDICINE

## 2022-03-22 PROCEDURE — 85025 COMPLETE CBC W/AUTO DIFF WBC: CPT | Performed by: EMERGENCY MEDICINE

## 2022-03-22 PROCEDURE — 96365 THER/PROPH/DIAG IV INF INIT: CPT

## 2022-03-22 PROCEDURE — 81025 URINE PREGNANCY TEST: CPT | Performed by: EMERGENCY MEDICINE

## 2022-03-22 PROCEDURE — 87086 URINE CULTURE/COLONY COUNT: CPT | Performed by: EMERGENCY MEDICINE

## 2022-03-22 PROCEDURE — 96367 TX/PROPH/DG ADDL SEQ IV INF: CPT

## 2022-03-22 PROCEDURE — 80053 COMPREHEN METABOLIC PANEL: CPT | Performed by: EMERGENCY MEDICINE

## 2022-03-22 PROCEDURE — U0002 COVID-19 LAB TEST NON-CDC: HCPCS | Performed by: EMERGENCY MEDICINE

## 2022-03-22 PROCEDURE — 96361 HYDRATE IV INFUSION ADD-ON: CPT

## 2022-03-22 PROCEDURE — 87040 BLOOD CULTURE FOR BACTERIA: CPT | Mod: 59 | Performed by: EMERGENCY MEDICINE

## 2022-03-22 PROCEDURE — 87651 STREP A DNA AMP PROBE: CPT | Performed by: EMERGENCY MEDICINE

## 2022-03-22 PROCEDURE — 83605 ASSAY OF LACTIC ACID: CPT | Performed by: EMERGENCY MEDICINE

## 2022-03-22 PROCEDURE — 96375 TX/PRO/DX INJ NEW DRUG ADDON: CPT

## 2022-03-22 RX ORDER — MORPHINE SULFATE 2 MG/ML
2 INJECTION, SOLUTION INTRAMUSCULAR; INTRAVENOUS
Status: COMPLETED | OUTPATIENT
Start: 2022-03-22 | End: 2022-03-22

## 2022-03-22 RX ORDER — GUAIFENESIN 100 MG/5ML
100-200 SOLUTION ORAL EVERY 4 HOURS PRN
Qty: 60 ML | Refills: 0 | Status: SHIPPED | OUTPATIENT
Start: 2022-03-22 | End: 2022-04-01 | Stop reason: ALTCHOICE

## 2022-03-22 RX ORDER — CLINDAMYCIN PHOSPHATE 900 MG/50ML
900 INJECTION, SOLUTION INTRAVENOUS
Status: COMPLETED | OUTPATIENT
Start: 2022-03-22 | End: 2022-03-22

## 2022-03-22 RX ORDER — AMOXICILLIN AND CLAVULANATE POTASSIUM 875; 125 MG/1; MG/1
1 TABLET, FILM COATED ORAL 2 TIMES DAILY
Qty: 14 TABLET | Refills: 0 | Status: SHIPPED | OUTPATIENT
Start: 2022-03-22 | End: 2022-04-01 | Stop reason: ALTCHOICE

## 2022-03-22 RX ADMIN — CLINDAMYCIN IN 5 PERCENT DEXTROSE 900 MG: 18 INJECTION, SOLUTION INTRAVENOUS at 04:03

## 2022-03-22 RX ADMIN — MORPHINE SULFATE 2 MG: 2 INJECTION, SOLUTION INTRAMUSCULAR; INTRAVENOUS at 03:03

## 2022-03-22 RX ADMIN — SODIUM CHLORIDE 1000 ML: 0.9 INJECTION, SOLUTION INTRAVENOUS at 01:03

## 2022-03-22 RX ADMIN — CEFTRIAXONE 1 G: 1 INJECTION, SOLUTION INTRAVENOUS at 04:03

## 2022-03-22 NOTE — ED PROVIDER NOTES
Encounter Date: 3/22/2022       History     Chief Complaint   Patient presents with    Headache    Cough    Generalized Body Aches    Weakness    Dizziness    Chest Pain    Sore Throat     Pt states the last time she had these symptoms she was positive for meninginitis.     36-year-old female presents today complaining of fever chills body aches and intermittent headache patient reports symptoms have been present since Friday patient reports that she is HIV positive, patient reports that viral load has been undetectable and she is taking her medications regularly.  Patient reports history of miscarriage last month and she is being monitored by OBGYN because her beta levels are decreasing slowly.  Patient reports sore throat and congestion patient denies any sick contacts patient denies neck stiffness patient denies sudden onset of headache or worst headache of life.  Currently patient is afebrile.  Patient reports that she does have a history of being diagnosed with viral meningitis in the past.  Patient reports that she has no vaginal discharge patient reports that she had a pelvic exam performed by her OBGYN late last week and no abnormalities were noted.        Review of patient's allergies indicates:  No Known Allergies  Past Medical History:   Diagnosis Date    HIV (human immunodeficiency virus infection)     Meningitis, unspecified 08/2020    Miscarriage 02/2022    Sickle cell trait      Past Surgical History:   Procedure Laterality Date    FRACTURE SURGERY      HERNIA REPAIR      SINUS SURGERY       Family History   Problem Relation Age of Onset    Breast cancer Neg Hx     Colon cancer Neg Hx     Ovarian cancer Neg Hx      Social History     Tobacco Use    Smoking status: Never Smoker    Smokeless tobacco: Never Used   Substance Use Topics    Alcohol use: No    Drug use: No     Review of Systems   Constitutional: Positive for fatigue and fever.   HENT: Positive for congestion and sore  throat. Negative for rhinorrhea and trouble swallowing.    Eyes: Negative for visual disturbance.   Respiratory: Positive for cough. Negative for chest tightness, shortness of breath and wheezing.    Cardiovascular: Negative for chest pain, palpitations and leg swelling.   Gastrointestinal: Negative for abdominal distention, abdominal pain, constipation, diarrhea, nausea and vomiting.   Genitourinary: Negative for difficulty urinating, dysuria, flank pain and frequency.   Musculoskeletal: Negative for arthralgias, back pain, joint swelling and neck pain.   Skin: Negative for color change and rash.   Neurological: Positive for headaches. Negative for dizziness, syncope, speech difficulty, weakness and numbness.   All other systems reviewed and are negative.      Physical Exam     Initial Vitals [03/22/22 1157]   BP Pulse Resp Temp SpO2   103/74 87 19 98.1 °F (36.7 °C) 100 %      MAP       --         Physical Exam    Nursing note and vitals reviewed.  Constitutional: She appears well-developed and well-nourished. She is not diaphoretic. No distress.   HENT:   Head: Normocephalic and atraumatic.   Right Ear: External ear normal.   Left Ear: External ear normal.   Nose: Nose normal.   Mouth/Throat: No oropharyngeal exudate.   Patient has mild oropharyngeal erythema patient also has an area to the tip of her tongue which is lighter than the surrounding area and measuring approximately 5 mm in diameter this is circular in nature, most consistent with either biting her tongue or small aphthous ulcer, no other oral lesions noted.   Eyes: Conjunctivae and EOM are normal. Pupils are equal, round, and reactive to light. Right eye exhibits no discharge. Left eye exhibits no discharge. No scleral icterus.   Neck: Neck supple. No thyromegaly present. No tracheal deviation present. No JVD present.   Negative Kernig and Brudzinski test negative jolt accentuation test   Normal range of motion.  Cardiovascular: Normal rate, regular  rhythm, normal heart sounds and intact distal pulses. Exam reveals no gallop and no friction rub.    No murmur heard.  Pulmonary/Chest: Breath sounds normal. No stridor. No respiratory distress. She has no wheezes. She has no rhonchi. She has no rales. She exhibits no tenderness.   Abdominal: Abdomen is soft. Bowel sounds are normal. She exhibits no distension and no mass. There is no abdominal tenderness. There is no rebound and no guarding.   Musculoskeletal:         General: No tenderness or edema. Normal range of motion.      Cervical back: Normal range of motion and neck supple.     Lymphadenopathy:     She has no cervical adenopathy.   Neurological: She is alert and oriented to person, place, and time. She has normal strength. No cranial nerve deficit or sensory deficit.   Skin: Skin is warm and dry. No rash and no abscess noted. No erythema. No pallor.         ED Course   Procedures  Labs Reviewed   CBC W/ AUTO DIFFERENTIAL - Abnormal; Notable for the following components:       Result Value    RBC 3.68 (*)     Hemoglobin 9.3 (*)     Hematocrit 29.7 (*)     MCV 81 (*)     MCH 25.3 (*)     MCHC 31.3 (*)     RDW 14.8 (*)     All other components within normal limits    Narrative:     Release to patient->Immediate   COMPREHENSIVE METABOLIC PANEL - Abnormal; Notable for the following components:    Calcium 8.6 (*)     Alkaline Phosphatase 48 (*)     ALT 9 (*)     Anion Gap 6 (*)     All other components within normal limits    Narrative:     Release to patient->Immediate   URINALYSIS, REFLEX TO URINE CULTURE - Abnormal; Notable for the following components:    Appearance, UA Hazy (*)     Specific Gravity, UA >1.030 (*)     Protein, UA 1+ (*)     Occult Blood UA 1+ (*)     Leukocytes, UA 1+ (*)     All other components within normal limits    Narrative:     Specimen Source->Urine   URINALYSIS MICROSCOPIC - Abnormal; Notable for the following components:    WBC, UA 13 (*)     Hyaline Casts, UA 23 (*)     All other  components within normal limits    Narrative:     Specimen Source->Urine   POCT URINE PREGNANCY - Abnormal; Notable for the following components:    POC Preg Test, Ur Positive (*)     All other components within normal limits   GROUP A STREP, MOLECULAR   CULTURE, BLOOD   CULTURE, BLOOD   CULTURE, URINE   SARS-COV-2 RNA AMPLIFICATION, QUAL   INFLUENZA A AND B ANTIGEN    Narrative:     Specimen Source->Nasopharyngeal Swab   LACTIC ACID, PLASMA    Narrative:     Release to patient->Immediate   HCG, QUANTITATIVE    Narrative:     Release to patient->Immediate          Imaging Results          CT Head Without Contrast (Final result)  Result time 03/22/22 15:25:50    Final result by Geoff Tran MD (03/22/22 15:25:50)                 Impression:      No CT evidence of acute intracranial pathology.      Electronically signed by: Geoff Tran MD  Date:    03/22/2022  Time:    15:25             Narrative:    EXAMINATION:  CT HEAD WITHOUT CONTRAST    CLINICAL HISTORY:  Subarachnoid hemorrhage (SAH) suspected;    TECHNIQUE:  CMS Mandated Quality Data-CT Radiation Dose-436    All CT scans at this facility dose modulation, iterative reconstruction, and or weight-based dosing when appropriate to reduce radiation dose to as low as reasonably achievable.    COMPARISON:  CT head 06/25/2012    FINDINGS:  Negative for acute intracranial hemorrhage, midline shift, or mass effect.  Ventricles and sulci are normal in size.  Gray-white differentiation is maintained.  Cerebellar hemispheres and brainstem are unremarkable.    No calvarial lesion or fracture.  Mastoid air cells are clear.  Visualized paranasal sinuses are clear.                               US OB Transvaginal (Final result)  Result time 03/22/22 15:04:41   Procedure changed from US Pelvis Comp with Transvag NON-OB (xpd     Final result by Kit Romo MD (03/22/22 15:04:41)                 Narrative:    CLINICAL HISTORY:  36 years (1985) Female fever/  bleeding    TECHNIQUE:  US PREGNANCY TRANSVAGINAL. 44 images obtained. Ultrasound examination of the pelvis was performed transabdominally and transvaginally . Images were obtained using grayscale, color flow and doppler where appropriate.    COMPARISON:  Most recent ultrasound from 2/3/2022.    FINDINGS:  Uterus: Normal in size but heterogeneous in echotexture with no discrete myometrial mass identified.  Position: Retroverted anteflexed.  Uterine size: 9.5 x 6.5 x 5.1 cm    Endometrium: Heterogeneous hyperemic somewhat focally thickened endometrial stripe measuring up to 19 mm in thickness at the body/fundus.    RIGHT ovary/adnexa: The right ovary is within normal limits with several follicles seen. There is normal color flow in the ovary.  Ovarian size: 3.1 x 2.5 x 2.4 cm    LEFT ovary/adnexa: The left ovary is within normal limits with numerous small follicles. There is normal color flow in the ovary.  Ovarian size: 3.1 x 2.7 x 1.8 cm    Fluid: There is no abnormal free fluid in the cul-de-sac.    IMPRESSION:  1. Heterogeneous thickened endometrial stripe with hyperemia suspicious for retained products of conception or endometritis (and much less likely gestational trophoblastic disease). Consider correlation with symptoms/history and ovary follow-up.  2. Physiologic appearing ovaries.            RESULT NOTIFICATION: These observations were flagged for review, to be communicated to the ordering provider PILLO LINTON at 3/22/2022 3:04 PM CDT.    Electronically signed by:  Kit Romo MD  3/22/2022 3:04 PM CDT Workstation: [id number]                             X-Ray Chest PA And Lateral (Final result)  Result time 03/22/22 14:16:15    Final result by Geoff Tran MD (03/22/22 14:16:15)                 Narrative:    XR CHEST 2 VIEWS    CLINICAL HISTORY:  36 years Female Headache, cough, body aches    COMPARISON: None    FINDINGS: Cardiomediastinal silhouette is within normal limits. Lungs are normally  expanded with no airspace consolidation. No pleural effusion or pneumothorax. No acute osseous abnormality.    IMPRESSION: No acute pulmonary process.    Electronically signed by:  Geoff Tran MD  3/22/2022 2:16 PM CDT Workstation: [id number]                               Medications   sodium chloride 0.9% bolus 1,000 mL (0 mLs Intravenous Stopped 3/22/22 1428)   clindamycin in D5W 900 mg/50 mL IVPB 900 mg (0 mg Intravenous Stopped 3/22/22 1800)   cefTRIAXone (ROCEPHIN) 1 g/50 mL D5W IVPB (0 g Intravenous Stopped 3/22/22 1639)   morphine injection 2 mg (2 mg Intravenous Given 3/22/22 1556)     Medical Decision Making:   History:   Old Medical Records: I decided to obtain old medical records.  Initial Assessment:   Emergent evaluation of a 36-year-old female presenting with fatigue, fever congestion and headache differential diagnosis is broad but includes URI, pneumonia, infection, miscarriage complication, I have low suspicion for meningitis given lack of neck stiffness, normal neurological and physical exam as well as negative jolt accentuation test and lack of measured fever.  I did discuss the possibility of lumbar puncture with the patient and patient declined.  Patient understands this decreases are diagnostic capabilities.            Attending Attestation:             Attending ED Notes:   Case discussed with OBGYN after ultrasound findings revealed endometrial thickening with findings concerning for possible retained products of conception versus endometritis.  Patient started on broad-spectrum antibiotics, OBGYN reports that they will take patient to the operating room for D&C, patient is to remain NPO.    When discussed this further with patient she reports that she has been having some lower abdominal discomfort intermittently and she had an episode a few days ago where she coughed and a large amount of blood came out vaginally.  Patient reports that this was odd and she meant to mention it to her  OBGYN.    Patient has mild urinary tract infection, patient has likely source of her symptoms I have lower suspicion for meningitis as a possible causative agent in the face of these findings patient is declining lumbar puncture at this time and I am in agreement with this decision.    Patient has received IV antibiotics, currently awaiting OBGYN for evaluation and disposition.    Patient seen and evaluated by OBGYN, she does not have any abdominal discomfort or discharge or fever.  Dr Tran does not feel patient has findings consistent with endometritis or retained products of conception.  At this time patient has no abdominal discomfort patient is tolerating p.o. in ER patient has no fever patient has stable vital signs and currently reports no complaint other than congestion.    Patient does have findings consistent with URI as well as UTI.  Patient will be started on a course of antibiotics and decongestant patient is to follow up with PCP for these findings in the next 1-2 days patient is additionally to follow-up with OBGYN for further evaluation and trending of her beta level.  Patient is advised to return immediately to the emergency department for any worsening or for any further concerns.                   Clinical Impression:   Final diagnoses:  [R50.9] Fever  [N39.0] Urinary tract infection without hematuria, site unspecified (Primary)  [J06.9] Upper respiratory tract infection, unspecified type          ED Disposition Condition    Discharge Stable        ED Prescriptions     Medication Sig Dispense Start Date End Date Auth. Provider    amoxicillin-clavulanate 875-125mg (AUGMENTIN) 875-125 mg per tablet Take 1 tablet by mouth 2 (two) times daily. 14 tablet 3/22/2022  Juan Leong MD    guaiFENesin 100 mg/5 ml (ROBITUSSIN) 100 mg/5 mL syrup Take 5-10 mLs (100-200 mg total) by mouth every 4 (four) hours as needed for Cough. 60 mL 3/22/2022 4/1/2022 Juan Leong MD        Follow-up Information      Follow up With Specialties Details Why Contact Info Additional Information    [facility] - Emergency Dept Emergency Medicine  If symptoms worsen [address] [id number] [phone] 1st floor    José Manuel Schreiber MD Family Medicine Schedule an appointment as soon as possible for a visit in 1 day  [id number] [id number] [phone]       Destiny Tran MD Obstetrics, Obstetrics and Gynecology Schedule an appointment as soon as possible for a visit in 1 day  [address]  [address]  [facility] OBSTETRICS & GYNECOLOGY  [facility] [address]  [phone]              Juan Leong MD  03/22/22 2406

## 2022-03-22 NOTE — ED TRIAGE NOTES
Had a rash x 1.5 weeks ago, dried up and resolved.    X 4 days complaints of fever, head/nasal congestion, nausea, vomiting, and body aches.  States last time she felt this way Dx: meningitis.

## 2022-03-22 NOTE — H&P (VIEW-ONLY)
"The patient is a 36-year-old female who is well known to me.  She had a complete miscarriage 6 weeks ago.  She followed up in my office last week for her 6 week examination and birth control.  Her UPT at that time was noted to be positive, so serum HCG was drawn and was found to be 33. This trended down from 170 several weeks ago and that was down from about 280 4 weeks ago.  At the time of the office visit the patient noted 1 episode where she coughed and had a large amount of blood come out vaginally 1 week prior, but no other significant bleeding.  That was about a week before she presented to the emergency room today.  Since I examined her in office and she was given Depo-Provera she has had no bleeding.  We discussed that it was taking some time for her HCG to clear from her blood stream, but she had no symptoms of retained products of conception and her HCG was steadily trending down.  She had an appointment scheduled to recheck her HCG in about 2 weeks.    She presented to the ER today complaining of upper respiratory symptoms for about 5 days-sore throat, nasal congestion, cough, subjective fever and chills at home, and she thought she had COVID.  She was afebrile upon arrival.  She denied pelvic pain, vaginal bleeding, foul vaginal discharge, and abdominal pain. Due to the history of the bleeding last week after she coughed, a pelvic ultrasound was ordered.  Pelvic ultrasound was significant for an endometrium of 17 mm and hyperemia which could be consistent with retained products of conception.  Her white blood cell count is 8, HCG today is 83, and urine is significant for protein occult blood and leukocytes.  Her workup has been negative for COVID flu and group a strep.    VSS, AF  Gen - NAD  Abdomen - soft, NTND  Ext - no edema,  No calf tenderness    A/p -   1. S/p SAB 6 weeks with "thick" endometrium and HCG of 83-   Due to differences in HCG between labs, it is hard to tell if her HCG actually went " up since last week.  Endometritis is extremely doubtful since she has no clinical symptoms.  Since the patient has absolutely no clinical symptoms of retained products of conception or endometritis, she will be discharged home and she will have an hCG scheduled in my office Monday with an ultrasound.  We discussed again that her hCG is going down very slowly, and retained products of conception could be a remote possibility, but I do not advise she have a D&C at this time since she is asymptomatic.  If her hCG in the office has indeed gone up from where it was last week, we will have to schedule a D&C.  I discussed that she should come back to the ER if she has heavy bleeding or severe pelvic pain.  Earlier today, my nurse discussed with her that a swab came back positive for mycoplasma genitalium, and she will go  the antibiotics for that on her way home.     2.  Upper respiratory infection-this is likely the cause of her fever and chills.  the patient will be discharged home and will treat her symptoms with over-the-counter medications.

## 2022-03-24 LAB
BACTERIA UR CULT: NORMAL
BACTERIA UR CULT: NORMAL

## 2022-03-27 LAB
BACTERIA BLD CULT: NORMAL
BACTERIA BLD CULT: NORMAL

## 2022-04-01 ENCOUNTER — HOSPITAL ENCOUNTER (OUTPATIENT)
Dept: PREADMISSION TESTING | Facility: HOSPITAL | Age: 37
Discharge: HOME | End: 2022-04-01
Attending: OBSTETRICS & GYNECOLOGY
Payer: MEDICAID

## 2022-04-01 VITALS
OXYGEN SATURATION: 99 % | HEIGHT: 63 IN | BODY MASS INDEX: 24.45 KG/M2 | WEIGHT: 138 LBS | RESPIRATION RATE: 18 BRPM | TEMPERATURE: 98 F | SYSTOLIC BLOOD PRESSURE: 106 MMHG | HEART RATE: 70 BPM | DIASTOLIC BLOOD PRESSURE: 70 MMHG

## 2022-04-01 DIAGNOSIS — Z01.818 PRE-OP TESTING: Primary | ICD-10-CM

## 2022-04-01 DIAGNOSIS — Z41.9 SURGERY, ELECTIVE: ICD-10-CM

## 2022-04-01 LAB — SARS-COV-2 RDRP RESP QL NAA+PROBE: NEGATIVE

## 2022-04-01 PROCEDURE — U0002 COVID-19 LAB TEST NON-CDC: HCPCS | Performed by: OBSTETRICS & GYNECOLOGY

## 2022-04-01 NOTE — PRE-PROCEDURE INSTRUCTIONS
Pre op instructions given; npo after midnight; will not take any meds am of surgery; verbalized understanding; questions answered. Advised not to remove green blood band. See AVS

## 2022-04-01 NOTE — DISCHARGE INSTRUCTIONS
To confirm, Your doctor has instructed you that surgery is scheduled for: April 4     Pre-Op will call the afternoon prior to surgery between 4:00 and 6:00 PM with the final arrival time.     1 Person can come with you the day of surgery.  Please park in the Garage Parking and come through front entrance.  Do not park in [facility] parking lot as you will have to walk to registration.     GO TO REGISTRATION     After registration, proceed past gift shop and through glass door ( [facility]) Check in at the nurses station to the left.   Do not eat or drink anything after midnight the night before your surgery - THIS INCLUDES  WATER, GUM, MINTS AND CANDY.  YOU MAY BRUSH YOUR TEETH BUT DO NOT SWALLOW     TAKE ONLY THESE MEDICATIONS WITH A SMALL SIP OF WATER THE MORNING OF YOUR PROCEDURE: NONE    Do not remove green blood band.     PLEASE NOTE:  The surgery schedule has many variables which may affect the time of your surgery case.  Family members should be available if your surgery time changes.  Plan to be here the day of your procedure between 4-6 hours.      DO NOT TAKE THESE MEDICATIONS 5-7 DAYS PRIOR to your procedure or per your surgeon's request: ASPIRIN, ALEVE, ADVIL, IBUPROFEN,  SHEREEN SELTZER, BC , FISH OIL , VITAMIN E, HERBALS  (May take Tylenol)                                                          IMPORTANT INSTRUCTIONS      Do not smoke, vape or drink alcoholic beverages 24 hours prior to your procedure.  Shower the night before AND the morning of your procedure with a Chlorhexidine wash such as Hibiclens or Dial antibacterial soap from the neck down.   No lotions, powder or oils on your skin after you shower.   Do not get it on your face or in your eyes.  You may use your own shampoo and face wash. This helps your skin to be as bacteria free as possible.    DO NOT remove hair from the surgery site.  Do not shave the incision site unless you are given specific instructions to do so.     Sleep in a bed with clean sheets.    Do not sleep with a pet in the bed.   If you wear contact lenses, dentures, hearing aids or glasses, bring a container to put them in during surgery and give to a family member for safe keeping.    Please leave all jewelry, piercing's and valuables at home.   Wear rubber soled shoes (no flip flops).  If your doctor has scheduled you for an overnight stay, bring a small overnight bag with any personal items you need.    Make arrangements in advance for transportation home by a responsible adult.      You must make arrangements for transportation, TAXI'S, UBER'S OR LYFTS ARE NOT ALLOWED.        If you have any questions about these instructions, call (Monday - Friday) Pre-Op Admit  Nursing  at [phone] or the Pre-Op Day Surgery Unit at [phone].

## 2022-04-04 ENCOUNTER — ANESTHESIA (OUTPATIENT)
Dept: SURGERY | Facility: HOSPITAL | Age: 37
End: 2022-04-04
Payer: MEDICAID

## 2022-04-04 ENCOUNTER — ANESTHESIA EVENT (OUTPATIENT)
Dept: SURGERY | Facility: HOSPITAL | Age: 37
End: 2022-04-04
Payer: MEDICAID

## 2022-04-04 ENCOUNTER — HOSPITAL ENCOUNTER (OUTPATIENT)
Facility: HOSPITAL | Age: 37
Discharge: HOME | End: 2022-04-04
Attending: OBSTETRICS & GYNECOLOGY | Admitting: OBSTETRICS & GYNECOLOGY
Payer: MEDICAID

## 2022-04-04 VITALS
DIASTOLIC BLOOD PRESSURE: 75 MMHG | HEIGHT: 63 IN | HEART RATE: 77 BPM | WEIGHT: 137.81 LBS | BODY MASS INDEX: 24.42 KG/M2 | SYSTOLIC BLOOD PRESSURE: 110 MMHG | TEMPERATURE: 98 F | OXYGEN SATURATION: 99 % | RESPIRATION RATE: 14 BRPM

## 2022-04-04 DIAGNOSIS — Z41.9 SURGERY, ELECTIVE: ICD-10-CM

## 2022-04-04 PROCEDURE — 01965 ANES INCOMPL/MISSED AB PX: CPT | Performed by: OBSTETRICS & GYNECOLOGY

## 2022-04-04 PROCEDURE — 63600175 PHARM REV CODE 636 W HCPCS: Performed by: NURSE ANESTHETIST, CERTIFIED REGISTERED

## 2022-04-04 PROCEDURE — 27201423 OPTIME MED/SURG SUP & DEVICES STERILE SUPPLY: Performed by: OBSTETRICS & GYNECOLOGY

## 2022-04-04 PROCEDURE — 71000015 HC POSTOP RECOV 1ST HR: Performed by: OBSTETRICS & GYNECOLOGY

## 2022-04-04 PROCEDURE — 36000705 HC OR TIME LEV I EA ADD 15 MIN: Performed by: OBSTETRICS & GYNECOLOGY

## 2022-04-04 PROCEDURE — 27000656 HC EYE GOGGLES: Performed by: NURSE ANESTHETIST, CERTIFIED REGISTERED

## 2022-04-04 PROCEDURE — 25000003 PHARM REV CODE 250: Performed by: OBSTETRICS & GYNECOLOGY

## 2022-04-04 PROCEDURE — 27202107 HC XP QUATRO SENSOR: Performed by: NURSE ANESTHETIST, CERTIFIED REGISTERED

## 2022-04-04 PROCEDURE — 63600175 PHARM REV CODE 636 W HCPCS: Performed by: OBSTETRICS & GYNECOLOGY

## 2022-04-04 PROCEDURE — 71000016 HC POSTOP RECOV ADDL HR: Performed by: OBSTETRICS & GYNECOLOGY

## 2022-04-04 PROCEDURE — 37000009 HC ANESTHESIA EA ADD 15 MINS: Performed by: OBSTETRICS & GYNECOLOGY

## 2022-04-04 PROCEDURE — 27000673 HC TUBING BLOOD Y: Performed by: NURSE ANESTHETIST, CERTIFIED REGISTERED

## 2022-04-04 PROCEDURE — 71000033 HC RECOVERY, INTIAL HOUR: Performed by: OBSTETRICS & GYNECOLOGY

## 2022-04-04 PROCEDURE — 25000003 PHARM REV CODE 250

## 2022-04-04 PROCEDURE — 36000704 HC OR TIME LEV I 1ST 15 MIN: Performed by: OBSTETRICS & GYNECOLOGY

## 2022-04-04 PROCEDURE — 37000008 HC ANESTHESIA 1ST 15 MINUTES: Performed by: OBSTETRICS & GYNECOLOGY

## 2022-04-04 PROCEDURE — 25000003 PHARM REV CODE 250: Performed by: NURSE ANESTHETIST, CERTIFIED REGISTERED

## 2022-04-04 RX ORDER — DIPHENHYDRAMINE HYDROCHLORIDE 50 MG/ML
12.5 INJECTION INTRAMUSCULAR; INTRAVENOUS
Status: DISCONTINUED | OUTPATIENT
Start: 2022-04-04 | End: 2022-04-04 | Stop reason: HOSPADM

## 2022-04-04 RX ORDER — OXYCODONE HYDROCHLORIDE 5 MG/1
5 TABLET ORAL
Status: DISCONTINUED | OUTPATIENT
Start: 2022-04-04 | End: 2022-04-04 | Stop reason: HOSPADM

## 2022-04-04 RX ORDER — OXYCODONE AND ACETAMINOPHEN 10; 325 MG/1; MG/1
1 TABLET ORAL EVERY 4 HOURS PRN
Status: DISCONTINUED | OUTPATIENT
Start: 2022-04-04 | End: 2022-04-04 | Stop reason: HOSPADM

## 2022-04-04 RX ORDER — DOXYCYCLINE 100 MG/1
100 CAPSULE ORAL EVERY 12 HOURS
Qty: 10 CAPSULE | Refills: 0 | Status: SHIPPED | OUTPATIENT
Start: 2022-04-04

## 2022-04-04 RX ORDER — PROCHLORPERAZINE EDISYLATE 5 MG/ML
5 INJECTION INTRAMUSCULAR; INTRAVENOUS EVERY 6 HOURS PRN
Status: DISCONTINUED | OUTPATIENT
Start: 2022-04-04 | End: 2022-04-04 | Stop reason: HOSPADM

## 2022-04-04 RX ORDER — ROCURONIUM BROMIDE 10 MG/ML
INJECTION, SOLUTION INTRAVENOUS
Status: DISCONTINUED | OUTPATIENT
Start: 2022-04-04 | End: 2022-04-04

## 2022-04-04 RX ORDER — MIDAZOLAM HYDROCHLORIDE 1 MG/ML
INJECTION INTRAMUSCULAR; INTRAVENOUS
Status: DISCONTINUED | OUTPATIENT
Start: 2022-04-04 | End: 2022-04-04

## 2022-04-04 RX ORDER — OXYCODONE HYDROCHLORIDE 5 MG/1
5 TABLET ORAL EVERY 4 HOURS PRN
Status: DISCONTINUED | OUTPATIENT
Start: 2022-04-04 | End: 2022-04-04 | Stop reason: HOSPADM

## 2022-04-04 RX ORDER — SUCCINYLCHOLINE CHLORIDE 20 MG/ML
INJECTION INTRAMUSCULAR; INTRAVENOUS
Status: DISCONTINUED | OUTPATIENT
Start: 2022-04-04 | End: 2022-04-04

## 2022-04-04 RX ORDER — IBUPROFEN 200 MG
600 TABLET ORAL EVERY 6 HOURS PRN
Status: DISCONTINUED | OUTPATIENT
Start: 2022-04-04 | End: 2022-04-04 | Stop reason: HOSPADM

## 2022-04-04 RX ORDER — FENTANYL CITRATE 50 UG/ML
INJECTION, SOLUTION INTRAMUSCULAR; INTRAVENOUS
Status: DISCONTINUED | OUTPATIENT
Start: 2022-04-04 | End: 2022-04-04

## 2022-04-04 RX ORDER — HYDROMORPHONE HYDROCHLORIDE 1 MG/ML
0.2 INJECTION, SOLUTION INTRAMUSCULAR; INTRAVENOUS; SUBCUTANEOUS EVERY 5 MIN PRN
Status: DISCONTINUED | OUTPATIENT
Start: 2022-04-04 | End: 2022-04-04 | Stop reason: HOSPADM

## 2022-04-04 RX ORDER — METHYLERGONOVINE MALEATE 0.2 MG/ML
200 INJECTION INTRAVENOUS ONCE
Status: COMPLETED | OUTPATIENT
Start: 2022-04-04 | End: 2022-04-04

## 2022-04-04 RX ORDER — FAMOTIDINE 10 MG/ML
INJECTION INTRAVENOUS
Status: DISCONTINUED | OUTPATIENT
Start: 2022-04-04 | End: 2022-04-04

## 2022-04-04 RX ORDER — PROPOFOL 10 MG/ML
VIAL (ML) INTRAVENOUS
Status: DISCONTINUED | OUTPATIENT
Start: 2022-04-04 | End: 2022-04-04

## 2022-04-04 RX ORDER — MISOPROSTOL 200 UG/1
800 TABLET ORAL EVERY 6 HOURS
Status: DISCONTINUED | OUTPATIENT
Start: 2022-04-04 | End: 2022-04-04 | Stop reason: HOSPADM

## 2022-04-04 RX ORDER — ONDANSETRON 2 MG/ML
INJECTION INTRAMUSCULAR; INTRAVENOUS
Status: DISCONTINUED | OUTPATIENT
Start: 2022-04-04 | End: 2022-04-04

## 2022-04-04 RX ORDER — DOXYCYCLINE 100 MG/1
100 CAPSULE ORAL EVERY 12 HOURS
Status: DISCONTINUED | OUTPATIENT
Start: 2022-04-04 | End: 2022-04-04 | Stop reason: HOSPADM

## 2022-04-04 RX ORDER — SODIUM CHLORIDE 0.9 % (FLUSH) 0.9 %
10 SYRINGE (ML) INJECTION
Status: DISCONTINUED | OUTPATIENT
Start: 2022-04-04 | End: 2022-04-04 | Stop reason: HOSPADM

## 2022-04-04 RX ORDER — MEPERIDINE HYDROCHLORIDE 50 MG/ML
12.5 INJECTION INTRAMUSCULAR; INTRAVENOUS; SUBCUTANEOUS EVERY 10 MIN PRN
Status: DISCONTINUED | OUTPATIENT
Start: 2022-04-04 | End: 2022-04-04 | Stop reason: HOSPADM

## 2022-04-04 RX ORDER — LIDOCAINE HYDROCHLORIDE 20 MG/ML
INJECTION, SOLUTION EPIDURAL; INFILTRATION; INTRACAUDAL; PERINEURAL
Status: DISCONTINUED | OUTPATIENT
Start: 2022-04-04 | End: 2022-04-04

## 2022-04-04 RX ORDER — ACETAMINOPHEN 10 MG/ML
INJECTION, SOLUTION INTRAVENOUS
Status: DISCONTINUED | OUTPATIENT
Start: 2022-04-04 | End: 2022-04-04

## 2022-04-04 RX ORDER — ONDANSETRON 2 MG/ML
4 INJECTION INTRAMUSCULAR; INTRAVENOUS DAILY PRN
Status: DISCONTINUED | OUTPATIENT
Start: 2022-04-04 | End: 2022-04-04 | Stop reason: HOSPADM

## 2022-04-04 RX ORDER — OXYCODONE HYDROCHLORIDE 5 MG/1
5-10 TABLET ORAL EVERY 4 HOURS PRN
Qty: 15 TABLET | Refills: 0 | Status: SHIPPED | OUTPATIENT
Start: 2022-04-04

## 2022-04-04 RX ADMIN — LIDOCAINE HYDROCHLORIDE 60 MG: 20 INJECTION, SOLUTION INTRAVENOUS at 12:04

## 2022-04-04 RX ADMIN — ROCURONIUM BROMIDE 10 MG: 10 INJECTION, SOLUTION INTRAVENOUS at 12:04

## 2022-04-04 RX ADMIN — MIDAZOLAM HYDROCHLORIDE 2 MG: 1 INJECTION, SOLUTION INTRAMUSCULAR; INTRAVENOUS at 12:04

## 2022-04-04 RX ADMIN — SODIUM CHLORIDE, SODIUM LACTATE, POTASSIUM CHLORIDE, AND CALCIUM CHLORIDE: .6; .31; .03; .02 INJECTION, SOLUTION INTRAVENOUS at 12:04

## 2022-04-04 RX ADMIN — PROPOFOL 150 MG: 10 INJECTION, EMULSION INTRAVENOUS at 12:04

## 2022-04-04 RX ADMIN — FAMOTIDINE 20 MG: 10 INJECTION, SOLUTION INTRAVENOUS at 12:04

## 2022-04-04 RX ADMIN — ACETAMINOPHEN 1000 MG: 10 INJECTION, SOLUTION INTRAVENOUS at 12:04

## 2022-04-04 RX ADMIN — FENTANYL CITRATE 100 MCG: 50 INJECTION INTRAMUSCULAR; INTRAVENOUS at 12:04

## 2022-04-04 RX ADMIN — DOXYCYCLINE 100 MG: 100 INJECTION, POWDER, LYOPHILIZED, FOR SOLUTION INTRAVENOUS at 12:04

## 2022-04-04 RX ADMIN — Medication 120 MG: at 12:04

## 2022-04-04 RX ADMIN — METHYLERGONOVINE MALEATE 200 MCG: 0.2 INJECTION, SOLUTION INTRAMUSCULAR; INTRAVENOUS at 01:04

## 2022-04-04 RX ADMIN — ONDANSETRON 4 MG: 2 INJECTION INTRAMUSCULAR; INTRAVENOUS at 12:04

## 2022-04-04 NOTE — OP NOTE
Preop diagnosis-status post spontaneous , retained products of conception  Postop diagnosis-the same  Procedure-suction dilation and curettage  Surgeon-Destiny Tran MD  Anesthesia-general  Complications-none  Blood loss-50 mL  Intraoperative findings-the vulva vagina and cervix all appeared grossly normal.  The cervix was soft and slightly dilated at the beginning of the case.  The uterine length was 8.5 cm by sound.  There were scant products of conception.    Procedure in detail-the risks benefits indications and alternatives of the procedure were discussed with the patient and informed consent was obtained.  She was taken to the operating room where general anesthesia was obtained without difficulty.  She was prepped and draped in normal sterile fashion in the dorsal lithotomy position and her bladder was drained with a straight catheter.  A weighted speculum was placed into the patient's vagina and the anterior lip of the cervix was grasped with a single-tooth tenaculum.  A uterine sound was used to measure the total uterine length to a now cm.  The cervix was easily dilated to approximately 8 mm size using Hegar dilators.  A size 8 suction curette was placed through the cervix into the endometrial cavity and suction was applied with return of products of conception.  A sharp curettage was done of all 4 quadrants of the uterine cavity and a gritty texture was noted.  The uterine cavity was then suctioned out 1 last time to remove all remaining blood and products.  The patient was given Cytotec 700 mcg per rectum and Methergine 0.2 mg intramuscularly.  At the end of the case there was no active bleeding seen from the cervical os.  The single-tooth tenaculum was removed and there was no active bleeding seen from this area either.  All instruments and sponges were removed from the vagina and cervix.  The patient tolerated the procedure well.  Sponge lap needle and instrument counts were correct x2.  She  was taken to recovery in stable condition.

## 2022-04-04 NOTE — ANESTHESIA PROCEDURE NOTES
Intubation    Date/Time: 4/4/2022 12:52 PM  Performed by: Tulio Hawkins CRNA  Authorized by: Tulio Hawkins CRNA     Intubation:     Induction:  Intravenous    Intubated:  Postinduction    Mask Ventilation:  Easy mask    Attempts:  1    Attempted By:  CRNA    Method of Intubation:  Video laryngoscopy    Blade:  Kelly 3    Laryngeal View Grade: Grade I - full view of cords      Difficult Airway Encountered?: No      Complications:  None    Airway Device:  Oral endotracheal tube    Airway Device Size:  7.5    Style/Cuff Inflation:  Cuffed (inflated to minimal occlusive pressure)    Tube secured:  21    Secured at:  The lips    Placement Verified By:  Capnometry and Revisualization with laryngoscopy    Complicating Factors:  None    Findings Post-Intubation:  BS equal bilateral

## 2022-04-04 NOTE — TRANSFER OF CARE
"Anesthesia Transfer of Care Note    Patient: Roberta De Leon    Procedure(s) Performed: Procedure(s) (LRB):  DILATION AND CURETTAGE, UTERUS, USING SUCTION (N/A)    Patient location: PACU    Anesthesia Type: general    Transport from OR: Transported from OR on room air with adequate spontaneous ventilation    Post pain: adequate analgesia    Post assessment: no apparent anesthetic complications    Post vital signs: stable    Level of consciousness: awake and alert    Nausea/Vomiting: no nausea/vomiting    Complications: none    Transfer of care protocol was followed      Last vitals:   Visit Vitals  /74 (BP Location: Left arm, Patient Position: Lying)   Pulse 66   Temp 36.8 °C (98.3 °F) (Oral)   Resp 16   Ht 5' 3" (1.6 m)   Wt 62.5 kg (137 lb 12.6 oz)   SpO2 100%   Breastfeeding No   BMI 24.41 kg/m²     "

## 2022-04-04 NOTE — ANESTHESIA POSTPROCEDURE EVALUATION
Anesthesia Post Evaluation    Patient: Roberta De Leon    Procedure(s) Performed: Procedure(s) (LRB):  DILATION AND CURETTAGE, UTERUS, USING SUCTION (N/A)    Final Anesthesia Type: general      Patient location during evaluation: PACU  Patient participation: Yes- Able to Participate  Level of consciousness: awake and alert  Post-procedure vital signs: reviewed and stable  Pain management: adequate  Airway patency: patent    PONV status at discharge: No PONV  Anesthetic complications: no      Cardiovascular status: hemodynamically stable  Respiratory status: unassisted, spontaneous ventilation and room air  Hydration status: euvolemic  Follow-up not needed.          Vitals Value Taken Time   /63 04/04/22 1400   Temp 36.2 °C (97.2 °F) 04/04/22 1400   Pulse 75 04/04/22 1411   Resp 24 04/04/22 1411   SpO2 100 % 04/04/22 1411   Vitals shown include unvalidated device data.      Event Time   Out of Recovery 14:12:52         Pain/Lexie Score: Lexie Score: 8 (4/4/2022  2:00 PM)

## 2022-04-04 NOTE — ANESTHESIA PREPROCEDURE EVALUATION
2022  Roberta De Leon is a 36 y.o., female.      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 She has never used smokeless tobacco.     Results for orders placed or performed during the hospital encounter of 19   EKG 12-lead    Collection Time: 19 11:42 AM    Narrative    Test Reason : R07.9,    Vent. Rate : 093 BPM     Atrial Rate : 093 BPM     P-R Int : 142 ms          QRS Dur : 068 ms      QT Int : 354 ms       P-R-T Axes : 069 061 050 degrees     QTc Int : 440 ms    Normal sinus rhythm  Normal ECG  When compared with ECG of 2013 09:38,  Significant changes have occurred  Confirmed by Nataanel Wasserman MD ([id number]) on 2019 7:36:37 PM    Referred By: [email or web address]   SELF           Confirmed By:Natanael Wasserman MD             Lab Results   Component Value Date    WBC 6.03 2022    HGB 10.6 (L) 2022    HCT 33.7 (L) 2022    MCV 80 (L) 2022     2022     BMP  Lab Results   Component Value Date     2022    K 3.9 2022     2022    CO2 24 2022    BUN 8 2022    CREATININE 0.7 2022    CALCIUM 9.2 2022    ANIONGAP 10 2022    GLU 88 2022    GLU 93 2022     (H) 2022       No results found for this or any previous  visit.          Pre-op Assessment    I have reviewed the Patient Summary Reports.     I have reviewed the Nursing Notes. I have reviewed the NPO Status.   I have reviewed the Medications.     Review of Systems  Anesthesia Hx:  No problems with previous Anesthesia Denies Hx of Anesthetic complications  Denies Family Hx of Anesthesia complications.   Denies Personal Hx of Anesthesia complications.   Social:  Non-Smoker    Hematology/Oncology:     Oncology Normal    -- Anemia: Hematology Comments: HIV+  Sickle cell trait    EENT/Dental:EENT/Dental Normal   Cardiovascular:  Cardiovascular Normal  ECG has been reviewed.    Pulmonary:  Pulmonary Normal    Renal/:  Renal/ Normal     Hepatic/GI:  Hepatic/GI Normal    Musculoskeletal:  Musculoskeletal Normal    Neurological:  Neurology Normal    Endocrine:  Endocrine Normal    Psych:  Psychiatric Normal           Physical Exam  General: Well nourished    Airway:  Mallampati: II   Mouth Opening: Normal  TM Distance: Normal  Tongue: Normal  Neck ROM: Normal ROM    Dental:  Intact    Chest/Lungs:  Clear to auscultation    Heart:  Rate: Normal  Rhythm: Regular Rhythm  Sounds: Normal        Anesthesia Plan  Type of Anesthesia, risks & benefits discussed:    Anesthesia Type: Gen ETT  Intra-op Monitoring Plan: Standard ASA Monitors  Post Op Pain Control Plan: multimodal analgesia  Induction:  IV  Airway Plan: Video and Direct  Informed Consent: Informed consent signed with the Patient and all parties understand the risks and agree with anesthesia plan.  All questions answered.   ASA Score: 2  Anesthesia Plan Notes: Erik Campos    Ready For Surgery From Anesthesia Perspective.     .

## 2022-11-28 ENCOUNTER — HOSPITAL ENCOUNTER (EMERGENCY)
Facility: HOSPITAL | Age: 37
Discharge: LEFT BEFORE DISCHARGE | End: 2022-11-28
Attending: EMERGENCY MEDICINE
Payer: MEDICAID

## 2022-11-28 VITALS
HEIGHT: 63 IN | TEMPERATURE: 99 F | RESPIRATION RATE: 16 BRPM | OXYGEN SATURATION: 100 % | WEIGHT: 140 LBS | BODY MASS INDEX: 24.8 KG/M2 | DIASTOLIC BLOOD PRESSURE: 90 MMHG | SYSTOLIC BLOOD PRESSURE: 134 MMHG | HEART RATE: 86 BPM

## 2022-11-28 DIAGNOSIS — T78.40XA ALLERGIC REACTION, INITIAL ENCOUNTER: ICD-10-CM

## 2022-11-28 DIAGNOSIS — R21 RASH: Primary | ICD-10-CM

## 2022-11-28 PROCEDURE — 99284 EMERGENCY DEPT VISIT MOD MDM: CPT

## 2022-11-28 PROCEDURE — 96372 THER/PROPH/DIAG INJ SC/IM: CPT | Performed by: NURSE PRACTITIONER

## 2022-11-28 PROCEDURE — 63600175 PHARM REV CODE 636 W HCPCS: Performed by: NURSE PRACTITIONER

## 2022-11-28 PROCEDURE — 25000003 PHARM REV CODE 250: Performed by: NURSE PRACTITIONER

## 2022-11-28 RX ORDER — DEXAMETHASONE SODIUM PHOSPHATE 4 MG/ML
8 INJECTION, SOLUTION INTRA-ARTICULAR; INTRALESIONAL; INTRAMUSCULAR; INTRAVENOUS; SOFT TISSUE
Status: COMPLETED | OUTPATIENT
Start: 2022-11-28 | End: 2022-11-28

## 2022-11-28 RX ORDER — DIPHENHYDRAMINE HCL 25 MG
25 CAPSULE ORAL
Status: COMPLETED | OUTPATIENT
Start: 2022-11-28 | End: 2022-11-28

## 2022-11-28 RX ADMIN — DEXAMETHASONE SODIUM PHOSPHATE 8 MG: 4 INJECTION, SOLUTION INTRA-ARTICULAR; INTRALESIONAL; INTRAMUSCULAR; INTRAVENOUS; SOFT TISSUE at 10:11

## 2022-11-28 RX ADMIN — DIPHENHYDRAMINE HYDROCHLORIDE 25 MG: 25 CAPSULE ORAL at 10:11

## 2022-11-28 NOTE — ED PROVIDER NOTES
Encounter Date: 11/28/2022       History     Chief Complaint   Patient presents with    Rash     SINCE NOV 15     Presents with generalized rash.  Rash appears to be whelps.  Positive itching.  Patient reports she was treated for the same about 2 weeks ago.  States the rash cleared but has come back in the past 2 days.  Denies shortness of breath or difficulty swallowing.    Review of patient's allergies indicates:  No Known Allergies  Past Medical History:   Diagnosis Date    HIV (human immunodeficiency virus infection)     Meningitis, unspecified 08/2020    Miscarriage 02/2022    Miscarriage 04/01/2022    Sickle cell trait      Past Surgical History:   Procedure Laterality Date    DILATION AND CURETTAGE OF UTERUS      DILATION AND CURETTAGE OF UTERUS USING SUCTION N/A 4/4/2022    Procedure: DILATION AND CURETTAGE, UTERUS, USING SUCTION;  Surgeon: Destiny Tran MD;  Location: [facility];  Service: OB/GYN;  Laterality: N/A;    FIXATION OF TENDON      right hand    FRACTURE SURGERY      HERNIA REPAIR       Family History   Problem Relation Age of Onset    Breast cancer Neg Hx     Colon cancer Neg Hx     Ovarian cancer Neg Hx      Social History     Tobacco Use    Smoking status: Never    Smokeless tobacco: Never   Substance Use Topics    Alcohol use: No    Drug use: No     Review of Systems   Constitutional:  Negative for fever.   Respiratory:  Negative for cough, shortness of breath and wheezing.    Cardiovascular:  Negative for chest pain, palpitations and leg swelling.   Gastrointestinal:  Negative for abdominal pain, diarrhea, nausea and vomiting.   Genitourinary:  Negative for dysuria.   Musculoskeletal:  Negative for back pain.   Skin:  Positive for rash. Negative for wound.   Neurological:  Negative for weakness.     Physical Exam     Initial Vitals [11/28/22 0813]   BP Pulse Resp Temp SpO2   (!) 134/90 86 16 99.1 °F (37.3 °C) 100 %      MAP       --         Physical Exam    Constitutional: She appears  well-developed and well-nourished.   HENT:   Head: Normocephalic and atraumatic.   Mouth/Throat: Oropharynx is clear and moist.   Eyes: Conjunctivae are normal.   Neck: Neck supple.   Normal range of motion.  Cardiovascular:  Normal rate, regular rhythm and normal heart sounds.           Pulmonary/Chest: Breath sounds normal. No respiratory distress.   Musculoskeletal:         General: Normal range of motion.      Cervical back: Normal range of motion and neck supple.     Neurological: She is alert and oriented to person, place, and time. No sensory deficit. GCS score is 15. GCS eye subscore is 4. GCS verbal subscore is 5. GCS motor subscore is 6.   Skin: Skin is warm and dry. Capillary refill takes less than 2 seconds. Rash noted.   Generalized hives.   Psychiatric: She has a normal mood and affect. Thought content normal.       ED Course   Procedures  Labs Reviewed - No data to display       Imaging Results    None          Medications   dexAMETHasone injection 8 mg (8 mg Intramuscular Given 11/28/22 1033)   diphenhydrAMINE capsule 25 mg (25 mg Oral Given 11/28/22 1034)     Medical Decision Making:   Initial Assessment:   Patient reports rash as whelps.  She reports itching.  Denies difficulty swallowing or breathing.  ED Management:  Patient was given Decadron IM and Benadryl p.o. she eloped after getting medication.  The entire time she was here in the ED she would never appeared to be in any acute distress.  She did not get prescriptions as she left before her treatment was completed  Have discussed this patient with Dr. Nevarez                        Clinical Impression:   Final diagnoses:  [R21] Rash (Primary)  [T78.40XA] Allergic reaction, initial encounter        ED Disposition Condition    Eloped Stable                Danya Lubin NP  11/28/22 [id number]

## 2023-07-23 ENCOUNTER — HOSPITAL ENCOUNTER (EMERGENCY)
Facility: HOSPITAL | Age: 38
Discharge: HOME | End: 2023-07-23
Attending: EMERGENCY MEDICINE
Payer: MEDICAID

## 2023-07-23 VITALS
HEART RATE: 72 BPM | SYSTOLIC BLOOD PRESSURE: 127 MMHG | OXYGEN SATURATION: 100 % | WEIGHT: 136 LBS | HEIGHT: 63 IN | BODY MASS INDEX: 24.1 KG/M2 | RESPIRATION RATE: 14 BRPM | TEMPERATURE: 99 F | DIASTOLIC BLOOD PRESSURE: 81 MMHG

## 2023-07-23 DIAGNOSIS — R10.33 PERIUMBILICAL ABDOMINAL PAIN: Primary | ICD-10-CM

## 2023-07-23 DIAGNOSIS — Z53.29 LEFT AGAINST MEDICAL ADVICE: ICD-10-CM

## 2023-07-23 LAB
ALBUMIN SERPL BCP-MCNC: 4.4 G/DL (ref 3.5–5.2)
ALP SERPL-CCNC: 56 U/L (ref 55–135)
ALT SERPL W/O P-5'-P-CCNC: 10 U/L (ref 10–44)
ANION GAP SERPL CALC-SCNC: 5 MMOL/L (ref 8–16)
AST SERPL-CCNC: 13 U/L (ref 10–40)
B-HCG UR QL: NEGATIVE
BASOPHILS # BLD AUTO: 0.01 K/UL (ref 0–0.2)
BASOPHILS NFR BLD: 0.2 % (ref 0–1.9)
BILIRUB SERPL-MCNC: 0.3 MG/DL (ref 0.1–1)
BILIRUB UR QL STRIP: NEGATIVE
BUN SERPL-MCNC: 7 MG/DL (ref 6–20)
CALCIUM SERPL-MCNC: 9.1 MG/DL (ref 8.7–10.5)
CHLORIDE SERPL-SCNC: 108 MMOL/L (ref 95–110)
CLARITY UR: CLEAR
CO2 SERPL-SCNC: 26 MMOL/L (ref 23–29)
COLOR UR: YELLOW
CREAT SERPL-MCNC: 0.9 MG/DL (ref 0.5–1.4)
CREAT SERPL-MCNC: 0.9 MG/DL (ref 0.5–1.4)
CTP QC/QA: YES
DIFFERENTIAL METHOD: ABNORMAL
EOSINOPHIL # BLD AUTO: 0.2 K/UL (ref 0–0.5)
EOSINOPHIL NFR BLD: 5.5 % (ref 0–8)
ERYTHROCYTE [DISTWIDTH] IN BLOOD BY AUTOMATED COUNT: 12.8 % (ref 11.5–14.5)
EST. GFR  (NO RACE VARIABLE): >60 ML/MIN/1.73 M^2
GLUCOSE SERPL-MCNC: 90 MG/DL (ref 70–110)
GLUCOSE UR QL STRIP: NEGATIVE
HCT VFR BLD AUTO: 40.3 % (ref 37–48.5)
HGB BLD-MCNC: 13.5 G/DL (ref 12–16)
HGB UR QL STRIP: NEGATIVE
IMM GRANULOCYTES # BLD AUTO: 0.01 K/UL (ref 0–0.04)
IMM GRANULOCYTES NFR BLD AUTO: 0.2 % (ref 0–0.5)
KETONES UR QL STRIP: NEGATIVE
LEUKOCYTE ESTERASE UR QL STRIP: NEGATIVE
LIPASE SERPL-CCNC: 26 U/L (ref 4–60)
LYMPHOCYTES # BLD AUTO: 1.9 K/UL (ref 1–4.8)
LYMPHOCYTES NFR BLD: 46.3 % (ref 18–48)
MCH RBC QN AUTO: 27.7 PG (ref 27–31)
MCHC RBC AUTO-ENTMCNC: 33.5 G/DL (ref 32–36)
MCV RBC AUTO: 83 FL (ref 82–98)
MONOCYTES # BLD AUTO: 0.4 K/UL (ref 0.3–1)
MONOCYTES NFR BLD: 8.9 % (ref 4–15)
NEUTROPHILS # BLD AUTO: 1.6 K/UL (ref 1.8–7.7)
NEUTROPHILS NFR BLD: 38.9 % (ref 38–73)
NITRITE UR QL STRIP: NEGATIVE
NRBC BLD-RTO: 0 /100 WBC
PH UR STRIP: 6 [PH] (ref 5–8)
PLATELET # BLD AUTO: 264 K/UL (ref 150–450)
PMV BLD AUTO: 9.5 FL (ref 9.2–12.9)
POTASSIUM SERPL-SCNC: 3.6 MMOL/L (ref 3.5–5.1)
PROT SERPL-MCNC: 7.6 G/DL (ref 6–8.4)
PROT UR QL STRIP: ABNORMAL
RBC # BLD AUTO: 4.87 M/UL (ref 4–5.4)
SAMPLE: NORMAL
SODIUM SERPL-SCNC: 139 MMOL/L (ref 136–145)
SP GR UR STRIP: 1.01 (ref 1–1.03)
URN SPEC COLLECT METH UR: ABNORMAL
UROBILINOGEN UR STRIP-ACNC: NEGATIVE EU/DL
WBC # BLD AUTO: 4.15 K/UL (ref 3.9–12.7)

## 2023-07-23 PROCEDURE — 25000003 PHARM REV CODE 250

## 2023-07-23 PROCEDURE — 80053 COMPREHEN METABOLIC PANEL: CPT

## 2023-07-23 PROCEDURE — 85025 COMPLETE CBC W/AUTO DIFF WBC: CPT

## 2023-07-23 PROCEDURE — 99285 EMERGENCY DEPT VISIT HI MDM: CPT | Mod: 25

## 2023-07-23 PROCEDURE — 81003 URINALYSIS AUTO W/O SCOPE: CPT

## 2023-07-23 PROCEDURE — 63600175 PHARM REV CODE 636 W HCPCS

## 2023-07-23 PROCEDURE — 25500020 PHARM REV CODE 255

## 2023-07-23 PROCEDURE — 96361 HYDRATE IV INFUSION ADD-ON: CPT

## 2023-07-23 PROCEDURE — 99281 EMR DPT VST MAYX REQ PHY/QHP: CPT | Mod: 25

## 2023-07-23 PROCEDURE — 96374 THER/PROPH/DIAG INJ IV PUSH: CPT | Mod: 59

## 2023-07-23 PROCEDURE — 83690 ASSAY OF LIPASE: CPT

## 2023-07-23 PROCEDURE — 81025 URINE PREGNANCY TEST: CPT

## 2023-07-23 PROCEDURE — 96375 TX/PRO/DX INJ NEW DRUG ADDON: CPT

## 2023-07-23 RX ORDER — NAPROXEN 500 MG/1
500 TABLET ORAL 2 TIMES DAILY WITH MEALS
Qty: 14 TABLET | Refills: 0 | Status: SHIPPED | OUTPATIENT
Start: 2023-07-23 | End: 2023-07-30

## 2023-07-23 RX ORDER — ONDANSETRON 2 MG/ML
4 INJECTION INTRAMUSCULAR; INTRAVENOUS
Status: COMPLETED | OUTPATIENT
Start: 2023-07-23 | End: 2023-07-23

## 2023-07-23 RX ORDER — MORPHINE SULFATE 2 MG/ML
2 INJECTION, SOLUTION INTRAMUSCULAR; INTRAVENOUS
Status: COMPLETED | OUTPATIENT
Start: 2023-07-23 | End: 2023-07-23

## 2023-07-23 RX ADMIN — IOHEXOL 100 ML: 350 INJECTION, SOLUTION INTRAVENOUS at 01:07

## 2023-07-23 RX ADMIN — SODIUM CHLORIDE 1000 ML: 0.9 INJECTION, SOLUTION INTRAVENOUS at 01:07

## 2023-07-23 RX ADMIN — ONDANSETRON 4 MG: 2 INJECTION INTRAMUSCULAR; INTRAVENOUS at 01:07

## 2023-07-23 RX ADMIN — MORPHINE SULFATE 2 MG: 2 INJECTION, SOLUTION INTRAMUSCULAR; INTRAVENOUS at 01:07

## 2023-07-23 NOTE — ED PROVIDER NOTES
Encounter Date: 7/23/2023       History     Chief Complaint   Patient presents with    Abdominal Pain     LEIGHA UMBILICAL SINCE HERNIA REPAIR DONE AT [facility] MAY 22     Patient is a 38 y.o. female with past medical history of HIV and hernia repair who presents to ED via self for concern for abdominal pain which began 3 day(s) ago.  Patient reports she had a hernia above her belly button repaired approximately 2 years ago.  Patient reports in May of this year she had another surgery and had mesh placement to repair the hernia.  Patient reports she was had abdominal pain since the surgery but for the last 3 days it has been much worse than normal.  Patient reports nausea 3 days ago but denies any since.  Patient denies any vomiting or diarrhea.  Patient denies fever.  Patient denies any cough, congestion, or runny nose.  Patient denies chest pain or shortness of breath.  Patient reports she has been constipated since the surgery and has a bowel movement once or twice a week. Patient reports she had a BM yesterday that was dark brown in color but denies any black or tarry stools or bright red color in her stools. Patient denies any vaginal pain or dysuria.  Patient is awake and alert in no acute distress.       Review of patient's allergies indicates:  No Known Allergies  Past Medical History:   Diagnosis Date    HIV (human immunodeficiency virus infection)     Meningitis, unspecified 08/2020    Miscarriage 02/2022    Miscarriage 04/01/2022    Sickle cell trait      Past Surgical History:   Procedure Laterality Date    DILATION AND CURETTAGE OF UTERUS      DILATION AND CURETTAGE OF UTERUS USING SUCTION N/A 4/4/2022    Procedure: DILATION AND CURETTAGE, UTERUS, USING SUCTION;  Surgeon: Destiny Tran MD;  Location: [facility];  Service: OB/GYN;  Laterality: N/A;    FIXATION OF TENDON      right hand    FRACTURE SURGERY      HERNIA REPAIR       Family History   Problem Relation Age of Onset    Breast cancer Neg Hx     Colon  cancer Neg Hx     Ovarian cancer Neg Hx      Social History     Tobacco Use    Smoking status: Never    Smokeless tobacco: Never   Substance Use Topics    Alcohol use: No    Drug use: No     Review of Systems   Constitutional:  Negative for fever.   HENT: Negative.  Negative for facial swelling, sore throat and voice change.    Respiratory:  Negative for shortness of breath.    Cardiovascular:  Negative for chest pain.   Gastrointestinal:  Positive for abdominal pain and nausea. Negative for abdominal distention, blood in stool, constipation, diarrhea and vomiting.   Genitourinary: Negative.    Musculoskeletal:  Negative for back pain.   Skin:  Negative for color change, pallor and rash.   Neurological: Negative.  Negative for weakness.   Hematological:  Does not bruise/bleed easily.   Psychiatric/Behavioral: Negative.       Physical Exam     Initial Vitals [07/23/23 1129]   BP Pulse Resp Temp SpO2   121/84 74 16 99 °F (37.2 °C) 100 %      MAP       --         Physical Exam    Nursing note and vitals reviewed.  Constitutional: She appears well-developed and well-nourished. She is not diaphoretic. No distress.   HENT:   Head: Normocephalic and atraumatic.   Right Ear: External ear normal.   Left Ear: External ear normal.   Nose: Nose normal.   Eyes: EOM are normal.   Neck:   Normal range of motion.  Cardiovascular:  Normal rate, regular rhythm, normal heart sounds and intact distal pulses.     Exam reveals no gallop and no friction rub.       No murmur heard.  Pulmonary/Chest: Breath sounds normal. No respiratory distress. She has no wheezes. She has no rhonchi. She has no rales. She exhibits no tenderness.   Abdominal: Abdomen is soft and flat. Bowel sounds are normal. She exhibits no distension and no mass. A surgical scar is present. There is abdominal tenderness in the periumbilical area. No hernia. Hernia confirmed negative in the umbilical area. There is no rebound and no guarding.   Musculoskeletal:          General: Normal range of motion.      Cervical back: Normal range of motion.     Neurological: She is alert and oriented to person, place, and time. She has normal strength. GCS score is 15. GCS eye subscore is 4. GCS verbal subscore is 5. GCS motor subscore is 6.   Skin: Skin is warm and dry. Capillary refill takes less than 2 seconds.   Psychiatric: She has a normal mood and affect. Her behavior is normal. Judgment and thought content normal.       ED Course   Procedures  Labs Reviewed   CBC W/ AUTO DIFFERENTIAL - Abnormal; Notable for the following components:       Result Value    Gran # (ANC) 1.6 (*)     All other components within normal limits   COMPREHENSIVE METABOLIC PANEL - Abnormal; Notable for the following components:    Anion Gap 5 (*)     All other components within normal limits   URINALYSIS, REFLEX TO URINE CULTURE - Abnormal; Notable for the following components:    Protein, UA Trace (*)     All other components within normal limits    Narrative:     Specimen Source->Urine   LIPASE   POCT URINE PREGNANCY   ISTAT CREATININE   POCT CREATININE          Imaging Results              CT Abdomen Pelvis With Contrast (Final result)  Result time 07/23/23 13:57:32      Final result by Naty Maldonado MD (07/23/23 13:57:32)                   Narrative:    All CT scans at this facility used dose modulation, iterative reconstruction and/or weight-based dosing when appropriate to reduce radiation doses  as low as reasonably achievable.    HISTORY: Abdominal pain, acute, nonlocalized    FINDINGS: Axial postcontrast imaging was performed with 100 mL Omnipaque 350 IV contrast .    CT ABDOMEN: The lung bases are clear. There is no pericardial effusion.    The liver, spleen and pancreas demonstrate normal enhancement without focal mass or biliary duct dilatation  Gallbladder is contracted  Adrenal glands are normal  Kidneys enhance symmetrically without hydronephrosis or calculi.    There are no thick-walled or  dilated bowel loops. There is no mesenteric or retroperitoneal adenopathy. The aorta is normal in caliber. The musculature is normal.    CT PELVIS: There is an IUD within the uterus. The ovaries are unremarkable. There is minimal free fluid within the cul-de-sac. The bladder is normal.  There are no acute osseous abnormalities.    IMPRESSION: Minimal free fluid in the cul-de-sac consistent with physiologic fluid    No acute abdominal or pelvic process    Electronically signed by:  Naty Maldonado MD  7/23/2023 1:57 PM CDT Workstation: [id number]                                     Medications   sodium chloride 0.9% bolus 1,000 mL 1,000 mL (1,000 mLs Intravenous New Bag 7/23/23 1352)   morphine injection 2 mg (2 mg Intravenous Given 7/23/23 1354)   ondansetron injection 4 mg (4 mg Intravenous Given 7/23/23 1354)   iohexoL (OMNIPAQUE 350) injection 100 mL (100 mLs Intravenous Given 7/23/23 1328)     Medical Decision Making:   Initial Assessment:   Patient is a 38 y.o. female with past medical history of HIV and hernia repair who presents to ED via self for concern for abdominal pain which began 3 day(s) ago.  Patient reports she had a hernia above her belly button repaired approximately 2 years ago.  Patient reports in May of this year she had another surgery and had mesh placement to repair the hernia.  Patient reports she was had abdominal pain since the surgery but for the last 3 days it has been much worse than normal.  Patient reports nausea 3 days ago but denies any since.  Patient denies any vomiting or diarrhea.  Patient denies fever.  Patient denies any cough, congestion, or runny nose.  Patient denies chest pain or shortness of breath.  Patient reports she has been constipated since the surgery and has a bowel movement once or twice a week.  Patient reports she had a BM yesterday that was dark brown in color but denies any black or tarry stools or bright red color in her stools.  Patient denies any  vaginal pain or dysuria.  Patient is awake and alert in no acute distress.       Differential Diagnosis:   Differential diagnosis include but not limited to acute abdomen, electrolyte abnormality, constipation, urinary tract infection, pyelonephritis, kidney stone, GI bleed, postsurgical complication  ED Management:  [facility]    Patient presents for emergent evaluation of acute abdominal pain that poses a possible threat to life and/or bodily function.    In the ED patient found to have acute abdominal pain to palpation around umbilical area and above umbilicus where old surgical scars present.  There is no obvious distention or erythema to the area.  Patient has normal bowel sounds in all 4 quadrants.  Patient's abdomen is soft.  Patient has clear lung sounds bilaterally with no increased work of breathing.  Patient denies chest pain or shortness of breath.  Patient is awake and alert in no acute distress.    I ordered labs and personally reviewed them.  Labs significant for CMP within normal limits except for anion gap 5, lipase 26, CBC within normal limits except for gran# (ANC) 1.6.  UA within normal limits except for 1+ protein   I ordered CT scan and personally reviewed it and reviewed the radiologist interpretation.  CT significant for IMPRESSION: Minimal free fluid in the cul-de-sac consistent with physiologic fluid. No acute abdominal or pelvic process.      Discharge [facility]  I discussed the patient presentation labs, CT findings with my attending Dr. Phelps.  Patient was managed in the ED with IV morphine, Zofran, and normal saline.    The response to treatment was good. Patient has no vomiting or diarrhea in no clinical signs of dehydration at this time.    Discussed with patient that she needs pain control and to follow up with her surgeon as soon as possible.  Detailed return precautions discussed to return to the ED for any new or worsening symptoms.  Patient states understanding.      Patient received IV  morphine 1 the ED, according to KATELYNN Mcneal who is the charge nurse in the ED the hospital policy states that patient have to remain in the ED for at least 6 hours after receiving IV narcotics for her driving herself home or need to have someone drive her home.  Patient reports she does not have anyone to drive her home and she does not want to wait any longer to be in the ED.  Patient signed out AMA yesterday leaving the ED against hospital policy for observation after IV narcotics.  Patient is of sound mind and body making this decision.    NP uses Epic and voice recognition software prone to occasional and minor errors that may persist in the medical record.            ED Course as of 07/23/23 1706   Sun Jul 23, 2023 1551 Discussed with the patient that she needs to try to find a ride home due to receiving morphine in the ER.  KATELYNN Rivero finding out hospital policy on timeframe if patient can drive herself home after receiving IV narcotics while in the ED. [MP]      ED Course User Index  [MP] Helena Gill NP                 Clinical Impression:   Final diagnoses:  [R10.33] Periumbilical abdominal pain (Primary)  [Z53.29] Left against medical advice        ED Disposition Condition    AMA Stable                  Helena Gill NP  07/23/23 1551       Helena Gill NP  07/23/23 1706

## 2023-07-23 NOTE — ED NOTES
Patient advised that she needed to wait 6 hours post morphine administration before leaving ED per hospital policy. Advised that driving would be illegal since she had IV narcotics. She verbalized understanding and said she needed to leave and would walk home. Per provider patient would need to sign AMA paperwork prior to leaving. AMA paperwork signed by 2 nurses and provider. Patient ambulated out without difficulty with daughter.

## 2023-07-23 NOTE — DISCHARGE INSTRUCTIONS
Please follow up with your surgeon as soon as possible for further evaluation and recheck.  Please return to the ED for worsening abdominal pain, vomiting, diarrhea, blood in your stool, fever, or any new or worsening concerns.

## 2023-07-23 NOTE — Clinical Note
"Roberta Chuadanny De Leon was seen and treated in our emergency department on 7/23/2023.  She may return to work on 07/26/2023.       If you have any questions or concerns, please don't hesitate to call.      Helena Gill, DONALDO"

## (undated) DEVICE — CANISTER D & C  003987-901

## (undated) DEVICE — DRESSING TELFA 3X8  1238

## (undated) DEVICE — TRAY MINOR SLIDELL MEMORIAL HOSPITAL

## (undated) DEVICE — UNDERGLOVE BIOGEL PI MICRO BLUE SZ 6.5

## (undated) DEVICE — PAD POST PERINEAL NO-001

## (undated) DEVICE — PAD BOVIE ADULT

## (undated) DEVICE — GLOVE BIOGEL MICRO SURGEON PINK SZ 6.5

## (undated) DEVICE — JELLY LUBRICATING TUBE 4OZ 4OZLUB

## (undated) DEVICE — GOWN SURGICAL BASIC LG

## (undated) DEVICE — CATHETER URETHRAL RED 16FR

## (undated) DEVICE — TRAY SKIN PREP DRY

## (undated) DEVICE — TUBING D & C 23116

## (undated) DEVICE — PACK LITHOTOMY 88521

## (undated) DEVICE — TOWEL OR BLUE      MDT2168284

## (undated) DEVICE — DRAPE UNDER BUTTOCKS W/SUCTION PORT

## (undated) DEVICE — GOWN SMART LRG 044673

## (undated) DEVICE — SOLUTION PREP IODINE 4OZ

## (undated) DEVICE — SOLUTION NACL 0.9% 3000ML

## (undated) DEVICE — SOLUTION SCRUB IODINE 4OZ

## (undated) DEVICE — GOWN X-LARGE 044674